# Patient Record
Sex: MALE
[De-identification: names, ages, dates, MRNs, and addresses within clinical notes are randomized per-mention and may not be internally consistent; named-entity substitution may affect disease eponyms.]

---

## 2017-02-15 ENCOUNTER — HOSPITAL ENCOUNTER (OUTPATIENT)
Dept: PHYSICAL THERAPY | Facility: CLINIC | Age: 57
Setting detail: THERAPIES SERIES
Discharge: HOME | End: 2017-02-15
Payer: COMMERCIAL

## 2017-02-15 PROCEDURE — 97140 MANUAL THERAPY 1/> REGIONS: CPT

## 2017-03-01 ENCOUNTER — HOSPITAL ENCOUNTER (OUTPATIENT)
Dept: PHYSICAL THERAPY | Facility: CLINIC | Age: 57
Setting detail: THERAPIES SERIES
Discharge: HOME | End: 2017-03-01
Payer: COMMERCIAL

## 2017-03-01 PROCEDURE — 97140 MANUAL THERAPY 1/> REGIONS: CPT

## 2017-03-15 ENCOUNTER — HOSPITAL ENCOUNTER (OUTPATIENT)
Dept: PHYSICAL THERAPY | Facility: CLINIC | Age: 57
Setting detail: THERAPIES SERIES
Discharge: HOME | End: 2017-03-15
Payer: COMMERCIAL

## 2017-03-15 PROCEDURE — 97140 MANUAL THERAPY 1/> REGIONS: CPT

## 2017-04-05 ENCOUNTER — HOSPITAL ENCOUNTER (OUTPATIENT)
Dept: PHYSICAL THERAPY | Facility: CLINIC | Age: 57
Setting detail: THERAPIES SERIES
Discharge: HOME | End: 2017-04-05
Payer: COMMERCIAL

## 2017-04-05 PROCEDURE — 97140 MANUAL THERAPY 1/> REGIONS: CPT

## 2017-04-05 PROCEDURE — 97110 THERAPEUTIC EXERCISES: CPT

## 2017-04-19 ENCOUNTER — HOSPITAL ENCOUNTER (OUTPATIENT)
Dept: PHYSICAL THERAPY | Facility: CLINIC | Age: 57
Setting detail: THERAPIES SERIES
Discharge: HOME | End: 2017-04-19
Payer: COMMERCIAL

## 2017-04-19 PROCEDURE — 97140 MANUAL THERAPY 1/> REGIONS: CPT

## 2017-04-19 PROCEDURE — 97112 NEUROMUSCULAR REEDUCATION: CPT

## 2017-04-19 PROCEDURE — 97110 THERAPEUTIC EXERCISES: CPT

## 2017-05-04 ENCOUNTER — HOSPITAL ENCOUNTER (OUTPATIENT)
Dept: PHYSICAL THERAPY | Facility: CLINIC | Age: 57
Setting detail: THERAPIES SERIES
Discharge: HOME | End: 2017-05-04
Payer: COMMERCIAL

## 2017-05-04 PROCEDURE — 97140 MANUAL THERAPY 1/> REGIONS: CPT

## 2017-05-04 PROCEDURE — 97112 NEUROMUSCULAR REEDUCATION: CPT

## 2017-06-12 ENCOUNTER — HOSPITAL ENCOUNTER (OUTPATIENT)
Dept: PHYSICAL THERAPY | Facility: CLINIC | Age: 57
Setting detail: THERAPIES SERIES
Discharge: HOME | End: 2017-06-12
Payer: COMMERCIAL

## 2017-06-12 PROCEDURE — 97140 MANUAL THERAPY 1/> REGIONS: CPT

## 2017-06-14 ENCOUNTER — HOSPITAL ENCOUNTER (OUTPATIENT)
Dept: PHYSICAL THERAPY | Facility: CLINIC | Age: 57
Setting detail: THERAPIES SERIES
Discharge: HOME | End: 2017-06-14
Payer: COMMERCIAL

## 2017-06-28 ENCOUNTER — HOSPITAL ENCOUNTER (OUTPATIENT)
Dept: PHYSICAL THERAPY | Facility: CLINIC | Age: 57
Setting detail: THERAPIES SERIES
Discharge: HOME | End: 2017-06-28
Payer: COMMERCIAL

## 2017-06-28 PROCEDURE — 97140 MANUAL THERAPY 1/> REGIONS: CPT

## 2017-07-26 ENCOUNTER — HOSPITAL ENCOUNTER (OUTPATIENT)
Dept: PHYSICAL THERAPY | Facility: CLINIC | Age: 57
Setting detail: THERAPIES SERIES
Discharge: HOME | End: 2017-07-26
Payer: COMMERCIAL

## 2017-07-26 PROCEDURE — 97140 MANUAL THERAPY 1/> REGIONS: CPT

## 2017-08-02 ENCOUNTER — APPOINTMENT (OUTPATIENT)
Dept: PHYSICAL THERAPY | Facility: CLINIC | Age: 57
End: 2017-08-02
Payer: COMMERCIAL

## 2017-08-16 ENCOUNTER — HOSPITAL ENCOUNTER (OUTPATIENT)
Dept: PHYSICAL THERAPY | Facility: CLINIC | Age: 57
Setting detail: THERAPIES SERIES
Discharge: HOME | End: 2017-08-16
Payer: COMMERCIAL

## 2017-08-16 PROCEDURE — 97140 MANUAL THERAPY 1/> REGIONS: CPT

## 2017-08-16 PROCEDURE — 97110 THERAPEUTIC EXERCISES: CPT

## 2017-08-16 PROCEDURE — 97161 PT EVAL LOW COMPLEX 20 MIN: CPT

## 2017-08-21 ENCOUNTER — HOSPITAL ENCOUNTER (OUTPATIENT)
Dept: PHYSICAL THERAPY | Facility: CLINIC | Age: 57
Setting detail: THERAPIES SERIES
Discharge: HOME | End: 2017-08-21
Payer: COMMERCIAL

## 2017-08-21 PROCEDURE — 97110 THERAPEUTIC EXERCISES: CPT

## 2017-08-21 PROCEDURE — 97140 MANUAL THERAPY 1/> REGIONS: CPT

## 2017-08-23 ENCOUNTER — HOSPITAL ENCOUNTER (OUTPATIENT)
Dept: PHYSICAL THERAPY | Facility: CLINIC | Age: 57
Setting detail: THERAPIES SERIES
End: 2017-08-23
Payer: COMMERCIAL

## 2017-08-23 ENCOUNTER — HOSPITAL ENCOUNTER (OUTPATIENT)
Dept: PHYSICAL THERAPY | Facility: CLINIC | Age: 57
Setting detail: THERAPIES SERIES
Discharge: HOME | End: 2017-08-23
Payer: COMMERCIAL

## 2017-08-23 PROCEDURE — 97140 MANUAL THERAPY 1/> REGIONS: CPT

## 2017-08-23 PROCEDURE — 97110 THERAPEUTIC EXERCISES: CPT

## 2017-08-28 ENCOUNTER — HOSPITAL ENCOUNTER (OUTPATIENT)
Dept: PHYSICAL THERAPY | Facility: CLINIC | Age: 57
Setting detail: THERAPIES SERIES
Discharge: HOME | End: 2017-08-28
Payer: COMMERCIAL

## 2017-08-28 PROCEDURE — 97110 THERAPEUTIC EXERCISES: CPT

## 2017-08-31 ENCOUNTER — HOSPITAL ENCOUNTER (OUTPATIENT)
Dept: PHYSICAL THERAPY | Facility: CLINIC | Age: 57
Setting detail: THERAPIES SERIES
Discharge: HOME | End: 2017-08-31
Payer: COMMERCIAL

## 2017-08-31 PROCEDURE — 97110 THERAPEUTIC EXERCISES: CPT

## 2017-09-07 ENCOUNTER — HOSPITAL ENCOUNTER (OUTPATIENT)
Dept: PHYSICAL THERAPY | Facility: CLINIC | Age: 57
Setting detail: THERAPIES SERIES
Discharge: HOME | End: 2017-09-07
Payer: COMMERCIAL

## 2017-09-07 PROCEDURE — 97110 THERAPEUTIC EXERCISES: CPT

## 2017-09-07 PROCEDURE — G0283 ELEC STIM OTHER THAN WOUND: HCPCS

## 2017-09-11 ENCOUNTER — HOSPITAL ENCOUNTER (OUTPATIENT)
Dept: PHYSICAL THERAPY | Facility: CLINIC | Age: 57
Setting detail: THERAPIES SERIES
Discharge: HOME | End: 2017-09-11
Payer: COMMERCIAL

## 2017-09-11 PROCEDURE — 97140 MANUAL THERAPY 1/> REGIONS: CPT

## 2017-09-14 ENCOUNTER — HOSPITAL ENCOUNTER (OUTPATIENT)
Dept: PHYSICAL THERAPY | Facility: CLINIC | Age: 57
Setting detail: THERAPIES SERIES
End: 2017-09-14
Payer: COMMERCIAL

## 2017-09-18 ENCOUNTER — HOSPITAL ENCOUNTER (OUTPATIENT)
Dept: PHYSICAL THERAPY | Facility: CLINIC | Age: 57
Setting detail: THERAPIES SERIES
Discharge: HOME | End: 2017-09-18
Payer: COMMERCIAL

## 2017-09-18 PROCEDURE — 97110 THERAPEUTIC EXERCISES: CPT

## 2017-09-18 PROCEDURE — G0283 ELEC STIM OTHER THAN WOUND: HCPCS

## 2017-09-20 ENCOUNTER — APPOINTMENT (OUTPATIENT)
Dept: PHYSICAL THERAPY | Facility: CLINIC | Age: 57
End: 2017-09-20
Payer: COMMERCIAL

## 2017-09-25 ENCOUNTER — HOSPITAL ENCOUNTER (OUTPATIENT)
Dept: PHYSICAL THERAPY | Facility: CLINIC | Age: 57
Setting detail: THERAPIES SERIES
Discharge: HOME | End: 2017-09-25
Payer: COMMERCIAL

## 2017-09-25 PROCEDURE — 97110 THERAPEUTIC EXERCISES: CPT

## 2017-09-28 ENCOUNTER — HOSPITAL ENCOUNTER (OUTPATIENT)
Dept: PHYSICAL THERAPY | Facility: CLINIC | Age: 57
Setting detail: THERAPIES SERIES
Discharge: HOME | End: 2017-09-28
Payer: COMMERCIAL

## 2017-09-28 PROCEDURE — 97110 THERAPEUTIC EXERCISES: CPT

## 2017-10-02 ENCOUNTER — APPOINTMENT (OUTPATIENT)
Dept: PHYSICAL THERAPY | Facility: CLINIC | Age: 57
End: 2017-10-02
Payer: COMMERCIAL

## 2017-10-03 ENCOUNTER — HOSPITAL ENCOUNTER (OUTPATIENT)
Dept: PHYSICAL THERAPY | Facility: CLINIC | Age: 57
Setting detail: THERAPIES SERIES
Discharge: HOME | End: 2017-10-03
Payer: COMMERCIAL

## 2017-10-03 PROCEDURE — 97110 THERAPEUTIC EXERCISES: CPT

## 2017-10-03 NOTE — FLOWSHEET NOTE
[x] [facility] [employer]  [address] [place] [address] [place]   Phone: [phone]  Fax: [phone]  Physical Therapy Daily Treatment Note  Date: 10/3/2017  Patient: Marilia Constantino : 1960 MRN: [id number]  Physician: Dr Yaya Escoto: Magda Parker Diagnosis: R femoral stress fx, stiffness R hip, pain in R thigh Rehab Codes: M25.651, M75.651   Onset date: 16 Next 's appt.:prn      Visit# / total visits:  Cancels/No Shows: 0      Subjective:   Pain: [] Yes [] No Location: B medial quad/adductor Pain Rating: (0-10 scale): 0/10   Pain altered Tx: [] No [x] Yes Action: ART/MFR  Comments: Continued progress with balance and L > R ant tib weakness, Reports walking 6 miles on  and was dragging his L LE by the time he was done.  Pancho Campoverde      Objective: =  pelvis this date, ambulating slightly faster, and less LOB.  2/5 L ant tib strength; R = 3-/5   Modalities:  hi volt stim to L ant tib.  10 min post ex   Exercises:  Exercise Reps/ Time Weight/ Level Comments Performed   Rev of HEP,  X     added 4 way hip with TB to HEP X   sci fit 10 min  1.5       Total Gym jumps (B LE)  14 Added 10/3           Total Gym -squats SL 2 min ea with breaks   L20    X   Lunge (alt) FW 2/10'  Added 10/3 X   Single FW lunge in place   Added 10/3, Increased LOB L X   FW lunge/side lunge   Added 10/3 LOB L X   4 pt hip ext/fire hydrant  B  X          Doorway st  HEP   30         pulleys 5 min            B TB pull down    blue   HEP inst      B TB row   blue   HEP inst       TM  5 min  2.5 mph         Bars - standing abd               Bars - march FW/BW 6x       Bwd marching increased balance loss (requires BUE to self-recover)      Bars - SLS - balance  30 s x 3 ea             Bars - rocker board balance  2 min l            Side-stepping/monster walks 15 ft x 4 ea  blk tube  Around feet X   Step ups 2/10 8 in        Heel raises/toe raises 10         X    [place]/cariocas  8/15'              BOSU - balance standing/weight shift 5 min    Added FW/BW, cw/ccw 10/3   X   Bars reach toward floor with SLS L > R   3 min    Added 9/28    Cone ex with 8 inch step, SLS   1 set ea      Added 10/3   X   Gastroc Stretch- wedge  1 min x 2    Added to HEP; added 9/5         slant board stretch   3 30      X   wall push ups  home      HEP inst       4 way hip ea  2/10 blue Added to HEP      4 way ankle with TB   rev   red   HEP inst      Other:           Specific Instructions for next treatment:Progress EX log with emphasis on balance and strengthening   Treatment Charges: Mins Units   [x] Modalities/ES Hi volt  10  NC   [x] Ther Exercise 60 4   [] Manual Therapy         [] Ther Activities         [] Aquatics         [] Vasocompression         [] NMR         Total Treatment time 61   4           Assessment: [x] Progressing toward goals. [] No change. [x] Other:Pt tolerated tx well today. Continued progression of balance noted. Patient with continued L LE weakness and will continue to benefit from PT intervention to improve overall strength, increase balance, and return pt to PLOF.  L ant tib weakness addressed with HiVolt stim today. Assess tolerance and effectiveness next treatment.       STG: (to be met in 6 treatments)  1. ? Pain: to <2/10 with running up to 10 miles  2. ? ROM: neg IP, TFL, HS, and RF stiffness. 3. ? Function: run within training program without increased pain  4. Independent with Home Exercise Programs  5. Demonstrate Knowledge of fall prevention  LTG: (to be met in 12 treatments)  1. Return to running without pain up to 16 miles or more per run.      Patient goals: To return to running injury free      Pt. Education: [x] Yes [] No [x] Reviewed Prior HEP/Ed: added standing calf stretch  Method of Education: [x] Verbal [x] Demo [] Written  Comprehension of Education:  [x] Verbalizes understanding. [x] Demonstrates understanding. [] Needs review.   [x] Demonstrates/verbalizes HEP/Ed previously given.       Plan: [x] Continue per plan of care.   [] Other:      Time In: 56 Time Out: 1005  Electronically signed by: Mustapha Lomeli PT

## 2017-10-05 ENCOUNTER — APPOINTMENT (OUTPATIENT)
Dept: PHYSICAL THERAPY | Facility: CLINIC | Age: 57
End: 2017-10-05
Payer: COMMERCIAL

## 2017-10-09 ENCOUNTER — HOSPITAL ENCOUNTER (OUTPATIENT)
Dept: PHYSICAL THERAPY | Facility: CLINIC | Age: 57
Setting detail: THERAPIES SERIES
Discharge: HOME | End: 2017-10-09
Payer: COMMERCIAL

## 2017-10-09 PROCEDURE — 97110 THERAPEUTIC EXERCISES: CPT

## 2017-10-09 NOTE — FLOWSHEET NOTE
[x] [facility] [employer]  [address] [place] [address]  Phone: [phone]  Fax: [phone]  Physical Therapy Daily Treatment Note  Date: 10/3/2017  Patient: Lawanda Patel : 1960 MRN: [id number]  Physician: Dr Jordy Rivera: Mary Yousif Diagnosis: R femoral stress fx, stiffness R hip, pain in R thigh Rehab Codes: M25.651, M75.651   Onset date: 16 Next 's appt.:prn      Visit# / total visits:  Cancels/No Shows: 0      Subjective:   Pain: [] Yes [] No Location: B medial quad/adductor Pain Rating: (0-10 scale): 0/10   Pain altered Tx: [] No [x] Yes Action: ART/MFR  Comments: Continued progress with balance and L > R ant tib weakness, Reports walking 4-6 miles per day      Objective: =  pelvis this date, ambulating slightly faster, and less LOB.  2/5 L ant tib strength; R = 3-/5   Modalities:  hi volt stim to L ant tib.  10 min post ex   Exercises:  Exercise Reps/ Time Weight/ Level Comments Performed   Rev of HEP,  X     added 4 way hip with TB to HEP X   sci fit 10 min  1.5    --   Total Gym jumps (B LE)  14 Added 10/3 --   TG unilateral DFs 3x15 L5/9 L/R (15/25%WB)  x   Total Gym - lat squats SL 2x15 L16 Modified  X   Lunge (alt) FW 2/10'  Added 10/3 X   Single FW lunge in place   Added 10/3, Increased LOB L X   FW lunge/side lunge   Added 10/3 LOB L X   4 pt hip ext/fire hydrant  B  X          Doorway st  HEP   30         pulleys 5 min            B TB pull down    blue   HEP inst      B TB row   blue   HEP inst       TM  10'  3.5 mph   hill program=5 x   Bars - standing abd               Bars - march FW/BW 6x       Bwd marching increased balance loss (requires BUE to self-recover)      Bars - SLS - balance  30 s x 3 ea             Bars - rocker board balance  2 min l            Side-stepping/monster walks 15 ft x 4 ea  blk tube  Around feet X   Clocks 5x  Functional LE reach x   Step ups 2/10 8 in        Heel raises/toe raises 10         -- review. [x] Demonstrates/verbalizes HEP/Ed previously given.       Plan: [x] Continue per plan of care.   [] Other:      Time In: 1110  Time Out:1220  Electronically signed by: Zulma Naranjo PT

## 2017-10-12 ENCOUNTER — HOSPITAL ENCOUNTER (OUTPATIENT)
Dept: PHYSICAL THERAPY | Facility: CLINIC | Age: 57
Setting detail: THERAPIES SERIES
Discharge: HOME | End: 2017-10-12
Payer: COMMERCIAL

## 2017-10-12 PROCEDURE — G0283 ELEC STIM OTHER THAN WOUND: HCPCS

## 2017-10-12 PROCEDURE — 97110 THERAPEUTIC EXERCISES: CPT

## 2017-10-12 NOTE — FLOWSHEET NOTE
[x] [facility] [employer]  [address] [place] [address]  Phone: [phone]  Fax: [phone]  Physical Therapy Daily Treatment Note  Date: 10/3/2017  Patient: Eddie Ackerman   : 1960  MRN: [id number]  Physician: Dr Nona Torres: Zeus Stanley Diagnosis: R femoral stress fx, stiffness R hip, pain in R thigh   Rehab Codes: M25.651, M75.651   Onset date: 16    Next 's appt.:prn   Visit# / total visits:    Cancels/No Shows: 0      Subjective:   Pain: [] Yes [x] No Location: B medial quad/adductor Pain Rating: (0-10 scale): 0/10   Pain altered Tx: [x] No [x] Yes Action:   Comments: reports that he had a significant amount of fatigue after his last visit, but nothing adverse per pt. Also reports the VMS to L ant tib was better as he feels that he had better motor control with VMS vs hi volt.      Objective:    Modalities:  VMS  to L ant tib.  10 min post ex   Exercises:  Exercise Reps/ Time Weight/ Level Comments Performed   Lateral elliptical 8'  Alt every 2 min x   Rev of HEP,  X     added 4 way hip with TB to HEP X   Total Gym jumps (B LE)  14 Added 10/3 --   TG unilateral DFs 3x15 L5/10 L/R (15/25%WB)  x   Total Gym - lat squats SL 2x15 L16 Modified  x   Lunge (alt) FW 2/10'  Added 10/3    Single FW lunge in place   Added 10/3, Increased LOB L    FW lunge/side lunge   Added 10/3 LOB L    4 pt hip ext/fire hydrant  B     Doorway st  HEP   30         pulleys 5 min            B TB pull down    blue   HEP inst      B TB row   blue   HEP inst       TM  10'  3.5 mph   hill program=5 HEP only   Bars - march FW/BW 6x       Bwd marching increased balance loss (requires BUE to self-recover)      Bars - SLS - balance  30 s x 3 ea             Bars - rocker board balance  2 min l            Side-stepping/monster walks 15 ft x 4 ea  blk tube  Around feet x   Clocks 5x  Functional LE reach x   Step ups 3 way 15 ea 4\"    x   Heel raises/toe raises 10         --    [place]  8/15'              BOSU - balance standing/weight shift 20    side to side x   BAPS 2x15 L2 susy x   Bars reach toward floor with SLS L > R   3 min    Added 9/28    Cone ex with 8 inch step, SLS   1 set ea      Added 10/3      Gastroc Stretch- wedge  1 min x 2    Added to HEP; added 9/5         slant board stretch   3 30\"    x   wall push ups  home      HEP inst       4 way hip ea  2/10 blue Added to HEP      4 way ankle with TB   rev   red   HEP inst   --   Other:   VMS to L ant tib 10'  10sec on/50 sec off          Specific Instructions for next treatment:Progress EX log with emphasis on balance and strengthening   Treatment Charges: Mins Units   [x] Modalities/ES VMS  10  NC   [x] Ther Exercise 60 4   [] Manual Therapy         [] Ther Activities         [] Aquatics         [] Vasocompression         [] NMR         Total Treatment time 79   4           Assessment: [x] Progressing toward goals. [] No change. [x] Other:pt demonstrates tendency towards quad dominance. Clocks were challenging enough to require a rest break every 1-2 reps. At 15% WB, client was able to perform 75% ROM with unilateral DF.  Brettmichelle Jennifer states the program was suffiently challenging.      STG: (to be met in 6 treatments)  1. ? Pain: to <2/10 with running up to 10 miles  2. ? ROM: neg IP, TFL, HS, and RF stiffness. 3. ? Function: run within training program without increased pain  4. Independent with Home Exercise Programs  5. Demonstrate Knowledge of fall prevention  LTG: (to be met in 12 treatments)  1. Return to running without pain up to 16 miles or more per run.      Patient goals: To return to running injury free      Pt. Education: [x] Yes [] No [] Reviewed Prior HEP/Ed:   Method of Education: [x] Verbal [x] Demo [] Written  Comprehension of Education:  [x] Verbalizes understanding. [x] Demonstrates understanding. [] Needs review.   [x] Demonstrates/verbalizes HEP/Ed previously given.       Plan: [x] Continue per plan of care.   [] Other:      Time In: 1110  Time Out:1220  Electronically signed by: Liyah Alcazar PT

## 2017-10-16 ENCOUNTER — HOSPITAL ENCOUNTER (OUTPATIENT)
Dept: PHYSICAL THERAPY | Facility: CLINIC | Age: 57
Setting detail: THERAPIES SERIES
Discharge: HOME | End: 2017-10-16
Payer: COMMERCIAL

## 2017-10-16 PROCEDURE — 97110 THERAPEUTIC EXERCISES: CPT

## 2017-10-16 NOTE — FLOWSHEET NOTE
self-recover)      Bars - SLS - balance  30 s x 3 ea             Bars - rocker board balance  2 min l            Side-stepping/monster walks 15 ft x 5 ea  blk tube  Around feet x   Clocks 5x   Functional LE reach x   Step ups 3 way 30 ea 4\"    x   Heel raises/toe raises 10         --    [place]/cari[place]as  8/15'              BOSU - balance standing/weight shift 20    side to side x   BAPS 2x15 L2 susy x   Bars reach toward floor with SLS L > R   3 min    Added 9/28     Cone ex with 8 inch step, SLS   1 set ea      Added 10/3      Gastroc Stretch- wedge  1 min x 2    Added to HEP; added 9/5         slant board stretch   3 30\"    x   wall push ups  home      HEP inst       4 way hip ea  2/10 blue Added to HEP       4 way ankle with TB   rev   red   HEP inst   --   Other:   VMS to L ant tib 10'  10sec on/50 sec off          Specific Instructions for next treatment:Progress EX log with emphasis on balance and strengthening   Treatment Charges: Mins Units   [x] Modalities/ES VMS  10  NC   [x] Ther Exercise 60 4   [] Manual Therapy         [] Ther Activities         [] Aquatics         [] Vasocompression         [] NMR         Total Treatment time 79   4           Assessment: [x] Progressing toward goals. [] No change. [x] Other:pt demonstrates tendency towards quad dominance. Clocks were challenging enough to require a rest break every 1-2 reps. At 15% WB, client was able to perform 75% ROM with unilateral DF.       STG: (to be met in 6 treatments)  1. ? Pain: to <2/10 with running up to 10 miles  2. ? ROM: neg IP, TFL, HS, and RF stiffness. 3. ? Function: run within training program without increased pain  4. Independent with Home Exercise Programs  5. Demonstrate Knowledge of fall prevention  LTG: (to be met in 12 treatments)  1. Return to running without pain up to 16 miles or more per run.      Patient goals: To return to running injury free      Pt.  Education: [x] Yes [] No [] Reviewed Prior HEP/Ed:   Method of Education: [x] Verbal [x] Demo [] Written  Comprehension of Education:  [x] Verbalizes understanding. [x] Demonstrates understanding. [] Needs review. [x] Demonstrates/verbalizes HEP/Ed previously given.       Plan: [x] Continue per plan of care.   [] Other:      Time In: 1110                                  Time Out:1220  Electronically signed by: Citlalli Winslow, PT

## 2017-10-18 ENCOUNTER — HOSPITAL ENCOUNTER (OUTPATIENT)
Dept: PHYSICAL THERAPY | Facility: CLINIC | Age: 57
Setting detail: THERAPIES SERIES
Discharge: HOME | End: 2017-10-18
Payer: COMMERCIAL

## 2017-10-18 PROCEDURE — 97140 MANUAL THERAPY 1/> REGIONS: CPT

## 2017-10-18 PROCEDURE — 97110 THERAPEUTIC EXERCISES: CPT

## 2017-10-18 NOTE — FLOWSHEET NOTE
[x] [facility] [employer]  [address] [place] [address]  Phone: [phone]  Fax: [phone]  Physical Therapy Daily Treatment Note  Date: 10/18/2017  Patient: Darrell Roberts                                    BDA: 1960                    MRN: [id number]  Physician: Dr Amador Anger: Bibiana Goss  Medical Diagnosis: R femoral stress fx, stiffness R hip, pain in R thigh   Rehab Codes: M25.651, M75.651   Onset date: 8-20-16                                                           Next Dr's appt.:prn   Visit# / total visits: 20/24                                         Cancels/No Shows: 0      Subjective:   Pain: [] Yes [x] No Location: B medial quad/adductor Pain Rating: (0-10 scale): 0/10 No pain. Just numbness in B hands and feet with the cold weather. Having MRI tonight on his neck. Pain altered Tx: [x] No [x] Yes Action:   Comments: reports that he felt good after the last visit. Mary Soles reports the VMS to L ant tib was better as he feels that he had better motor control with VMS. Pt reports he walked 6 miles over the weekend and didn't get a\" foot drop\" until the end.       Objective:  Pt still presents with severe forward head position. Modalities:  VMS  to L ant tib. 10 min post ex, CST/MFR to SOB, with visual I & D to cranial vault in supine with 2 pillows for 20 min.    Exercises:  Exercise Reps/ Time Weight/ Level Comments Performed   Lateral elliptical 8'  L3 Alt every 2 min x   Rev of HEP,  X     added 4 way hip with TB to HEP X   Total Gym jumps (B LE) 2/8 14 Added 10/3 --   TG unilateral DFs 3x15 L16 L/R  x   Total Gym - lat squats SL 3x15 L16 Modified  x   Lunge (alt) FW 2/10'   Added 10/3     Single FW lunge in place 1/8   Added 10/3, Increased LOB L     FW lunge/side lunge 1/5   Added 10/3 LOB L     4 pt hip ext 10 B  emphasis on core and scap ret  x   Doorway st  HEP   30         pulleys 5 min            B TB pull down 2/12   blue   HEP inst      B TB row 2/12  blue   HEP inst       TM  10'  3.5 mph   hill program=5 HEP only   Bars - march FW/BW 6x       Bwd marching increased balance loss (requires BUE to self-recover)      Bars - SLS - balance  30 s x 3 ea             Bars - rocker board balance  2 min l            Side-stepping/monster walks 15 ft x 5 ea  blk tube  Around feet x   Clocks 5x   Functional LE reach x   Step ups 3 way 3/5 ea 4\"    x   Heel raises/toe raises 10         --    [place]/cari[place]  8/15'              BOSU - balance standing/weight shift 20    side to side x   BAPS 2x15 L2 susy x   Bars reach toward floor with SLS L > R   3 min    Added 9/28     Cone ex with 8 inch step, SLS   1 set ea      Added 10/3      Gastroc Stretch- wedge  1 min x 2    Added to HEP; added 9/5         slant board stretch   3 30\"    x   wall push ups  home      HEP inst       4 way hip ea  2/10 blue Added to HEP       4 way ankle with TB   rev   red   HEP inst   --   Other:   VMS to L ant tib 10'  10sec on/50 sec off          Specific Instructions for next treatment:Progress EX log with emphasis on balance and strengthening, postural inst  Treatment Charges: Mins Units   [x] Modalities/ES VMS  10  NC   [x] Ther Exercise 60 4   [x] Manual Therapy   20   1   [] Ther Activities         [] Aquatics         [] Vasocompression         [] NMR         Total Treatment time 90   5           Assessment: [x] Progressing toward goals. Improved head and neck position post manual therapy  [] No change. [x] Other:pt demonstrates tendency towards quad dominance. Fewer breaks needed with ex. Needs cues for technique especial with clocks, monster walk position and step ups.        STG: (to be met in 6 treatments)  1. ? Pain: to <2/10 with running up to 10 miles  2. ? ROM: neg IP, TFL, HS, and RF stiffness. 3. ? Function: run within training program without increased pain  4. Independent with Home Exercise Programs  5.  Demonstrate Knowledge of fall prevention  LTG: (to be met in 12 treatments)  1. Return to running without pain up to 16 miles or more per run.      Patient goals: To return to running injury free      Pt. Education: [x] Yes [] No [x] Reviewed Prior HEP/Ed: scap and cervical retraction exercises  Method of Education: [x] Verbal [x] Demo [] Written  Comprehension of Education:  [x] Verbalizes understanding. [x] Demonstrates understanding. [] Needs review. [x] Demonstrates/verbalizes HEP/Ed previously given.       Plan: [x] Continue per plan of care.    [] Other:      Time In: 0830                               Time Out:1000  Electronically signed by: Anni Menjivar, PT

## 2017-10-23 ENCOUNTER — HOSPITAL ENCOUNTER (OUTPATIENT)
Dept: PHYSICAL THERAPY | Facility: CLINIC | Age: 57
Setting detail: THERAPIES SERIES
Discharge: HOME | End: 2017-10-23
Payer: COMMERCIAL

## 2017-10-23 PROCEDURE — 97110 THERAPEUTIC EXERCISES: CPT

## 2017-10-23 NOTE — FLOWSHEET NOTE
hill program=5 HEP only   Bars - march FW/BW 6x       Bwd marching increased balance loss (requires BUE to self-recover)      Bars - SLS - balance  30 s x 3 ea             Bars - rocker board balance  2 min l            Side-stepping/monster walks 15 ft x 5 ea  blk tube  Around feet x   Clocks 5x   Functional LE reach x   Step ups 3 way 3/5 ea 4\"    x   Heel raises/toe raises 10         --    [place]/cariocas  8/15'              BOSU - balance standing/weight shift 20  cw/ccw, squats added squats 10/23 x   BAPS 2x15 L2 susy x   Bars reach toward floor with SLS L > R   3 min    Added 9/28     Cone ex with 8 inch step, SLS   1 set ea        x   Gastroc Stretch- wedge  1 min x 2    Added to HEP; added 9/5         slant board stretch   3 30\"    x   wall push ups  home      HEP inst       4 way hip ea  2/10 blue Added to HEP       4 way ankle with TB   rev   red   HEP inst   --   Flex/abd PREs B UEs 12 2#  x                                      Other:   VMS to L ant tib 10'  10sec on/50 sec off          Specific Instructions for next treatment:Progress EX log with emphasis on balance and strengthening, postural inst  Treatment Charges: Mins Units   [x] Modalities/ES VMS  10  NC   [x] Ther Exercise 60 4   [x] Manual Therapy        [] Ther Activities         [] Aquatics         [] Vasocompression         [] NMR         Total Treatment time 61   4           Assessment: [x] Progressing toward goals. Improved balance, but continues to have LOB with single leg functional activities  [] No change. [x] Other:pt demonstrates tendency towards quad dominance. Fewer breaks needed with ex. Needs cues for technique especial with clocks, monster walk position and step ups.        STG: (to be met in 6 treatments)  1. ? Pain: to <2/10 with running up to 10 miles  2. ? ROM: neg IP, TFL, HS, and RF stiffness. 3. ? Function: run within training program without increased pain  4. Independent with Home Exercise Programs  5.  Demonstrate Knowledge of fall prevention  LTG: (to be met in 12 treatments)  1. Return to running without pain up to 16 miles or more per run.      Patient goals: To return to running injury free      Pt. Education: [x] Yes [] No [x] Reviewed Prior HEP/Ed: scap and cervical retraction exercises  Method of Education: [x] Verbal [x] Demo [] Written  Comprehension of Education:  [x] Verbalizes understanding. [x] Demonstrates understanding. [] Needs review. [x] Demonstrates/verbalizes HEP/Ed previously given.       Plan: [x] Continue per plan of care.    [] Other:      Time In: 0789                              [facility] [id number]  Electronically signed by: Darien Almaraz, PT

## 2017-10-25 ENCOUNTER — APPOINTMENT (OUTPATIENT)
Dept: PHYSICAL THERAPY | Facility: CLINIC | Age: 57
End: 2017-10-25
Payer: COMMERCIAL

## 2017-10-30 ENCOUNTER — HOSPITAL ENCOUNTER (OUTPATIENT)
Dept: PHYSICAL THERAPY | Facility: CLINIC | Age: 57
Setting detail: THERAPIES SERIES
Discharge: HOME | End: 2017-10-30
Payer: COMMERCIAL

## 2017-10-30 PROCEDURE — 97140 MANUAL THERAPY 1/> REGIONS: CPT

## 2017-10-30 PROCEDURE — 97110 THERAPEUTIC EXERCISES: CPT

## 2017-10-30 NOTE — FLOWSHEET NOTE
Side-stepping/monster walks 15 ft x 5 ea  blk tube  Around feet x   Clocks 5x   Functional LE reach x   Step ups 3 way 3/5 ea 4\"    x   Heel raises/toe raises 10         --    [place]/cariocas  8/15'              BOSU - balance standing/weight shift 20  cw/ccw, squats added squats 10/23 x   BAPS 2x15 L2 susy x   Bars reach toward floor with SLS L > R   3 min    Added 9/28     Cone ex with 8 inch step, SLS   1 set ea        x   Gastroc Stretch- wedge  1 min x 2    Added to HEP; added 9/5         slant board stretch   3 30\"    x   wall push ups  home      HEP inst       4 way hip ea  2/10 blue Added to HEP       4 way ankle with TB   rev   red   HEP inst   --   Flex/abd PREs B UEs 12 2#   x   eccentric calf raises  10    on step, 2 up 1 down  x                                                   Other:   VMS to L ant tib 10'  10sec on/50 sec off          Specific Instructions for next treatment:Progress EX log with emphasis on balance and strengthening, postural inst. CST/MFR, SOR  Treatment Charges: Mins Units   [x] Modalities/ES VMS  10  NC   [x] Ther Exercise 60 4   [x] Manual Therapy  15   1   [] Ther Activities         [] Aquatics         [] Vasocompression         [] NMR         Total Treatment time 76   5           Assessment: [x] Progressing toward goals. Improved balance, but continues to have LOB with single leg functional activities  [] No change. [x] Other:pt demonstrates tendency towards quad dominance. Fewer breaks needed with ex. Needs cues for technique especial with clocks, monster walk position and step ups.        STG: (to be met in 6 treatments)  1. ? Pain: to <2/10 with running up to 10 miles  2. ? ROM: neg IP, TFL, HS, and RF stiffness. 3. ? Function: run within training program without increased pain  4. Independent with Home Exercise Programs  5. Demonstrate Knowledge of fall prevention  LTG: (to be met in 12 treatments)  1.  Return to running without pain up to 16 miles or more per run.      Patient goals: To return to running injury free      Pt. Education: [x] Yes [] No [x] Reviewed Prior HEP/Ed: scap and cervical retraction exercises  Method of Education: [x] Verbal [x] Demo [] Written  Comprehension of Education:  [x] Verbalizes understanding. [x] Demonstrates understanding. [] Needs review. [x] Demonstrates/verbalizes HEP/Ed previously given.       Plan: [x] Continue per plan of care.    [] Other:      Time In: 0132                              [facility] [id number]  Electronically signed by: Sharon Cha PT

## 2017-11-06 ENCOUNTER — HOSPITAL ENCOUNTER (OUTPATIENT)
Dept: PHYSICAL THERAPY | Facility: CLINIC | Age: 57
Setting detail: THERAPIES SERIES
Discharge: HOME | End: 2017-11-06
Payer: COMMERCIAL

## 2017-11-06 PROCEDURE — 97110 THERAPEUTIC EXERCISES: CPT

## 2017-11-06 NOTE — FLOWSHEET NOTE
[x] [facility]  [address] [facility]  Phone: [phone]  Fax: [phone]  Physical Therapy Daily Treatment Note  Date: 11/6/2017  Patient: Toan Chamorro                                    WSM: 1960                    MRN: [id number]  Physician: Dr Sharon Zuluaga: [facility] Diagnosis: R femoral stress fx, stiffness R hip, pain in R thigh   Rehab Codes: M25.651, M75.651   Onset date: 8-20-16                                                           Next 's appt.:prn   Visit# / total visits: 25/32                                       Cancels/No Shows: 0      Subjective:   Pain: [] Yes [x] No Location: B medial quad/adductor Pain Rating: (0-10 scale): 0/10 Ran 8 miles this weekend. [place] HS pull on left side after about 6 miles with L foot starting to flop. Pain altered Tx: [x] No [x] Yes Action:   Comments  Run/walking 4 miles a day.        Objective:  Pt still presents with severe forward head position. Modalities:  VMS  to L ant tib.  10 min post ex, cupping to L HS with ROM in R sidelying  Exercises:  Exercise Reps/ Time Weight/ Level Comments Performed   Lateral elliptical 8'  L3 Alt every 2 min x   Rev of HEP,  X     added 4 way hip with TB to HEP X   Total Gym jumps (B LE) rev 14 Added 10/3 --   TG unilateral DFs 3x15 L18 L/R  x   Total Gym - lat squats SL 3x15 L18   x   Lunge (alt) FW 2/10'   Added 10/3     Single FW lunge in place 1/8   Added 10/3, Increased LOB L     FW lunge/side lunge 1/5   Added 10/3 LOB L                 4 pt hip ext with opp UE flex alt 8     x   4 pt hip ext 10 B  emphasis on core and scap ret  x   Doorway st  HEP   30         pulleys 5 min            B TB pull down 2/12   blue   HEP inst      B TB row 2/12  blue   HEP inst       TM  10'  3.5 mph   hill program=5 HEP only   Bars - march FW/BW 6x       Bwd marching increased balance loss (requires BUE to self-recover)      Bars - SLS -

## 2017-11-13 ENCOUNTER — HOSPITAL ENCOUNTER (OUTPATIENT)
Dept: PHYSICAL THERAPY | Facility: CLINIC | Age: 57
Setting detail: THERAPIES SERIES
Discharge: HOME | End: 2017-11-13
Payer: COMMERCIAL

## 2017-11-13 PROCEDURE — 97110 THERAPEUTIC EXERCISES: CPT

## 2017-11-13 PROCEDURE — G0283 ELEC STIM OTHER THAN WOUND: HCPCS

## 2017-11-13 PROCEDURE — 97140 MANUAL THERAPY 1/> REGIONS: CPT

## 2017-11-13 NOTE — FLOWSHEET NOTE
[x] [facility]  [address] [facility]  Phone: [phone]  Fax: [phone]  Physical Therapy Daily Treatment Note  Date: 11/13/2017  Patient: Eryn Renteria                                    KCT: 1960                    MRN: [id number]  Physician: Dr Karlos Conte: [facility]  Medical Diagnosis: R femoral stress fx, stiffness R hip, pain in R thigh   Rehab Codes: M25.651, M75.651   Onset date: 8-20-16                                                           Next 's appt.:prn   Visit# / total visits: 26/32                                       Cancels/No Shows: 0      Subjective:   Pain: [] Yes [x] No Location: B medial quad/adductor Pain Rating: (0-10 scale): 3/10 L HS prox attachment after run/walking 9 miles this weekend. L foot starting to flop at mile 6   Pain altered Tx: [x] No [x] Yes Action:   Comments  Run/walking 4 miles a day.        Objective:  B hip flexor tightness. Pt still presents with severe forward head position. Modalities:  VMS  to L ant tib.  10 min post ex, cupping to L HS with ROM in R sidelying, ART to L psoas  Exercises:  Exercise Reps/ Time Weight/ Level Comments Performed   Lateral elliptical 8'  L3 Alt every 2 min x   Rev of HEP,  X     added 4 way hip with TB to HEP X   Total Gym jumps (B LE) rev 14 Added 10/3 --   TG unilateral DFs 3x15 L18 L/R     Total Gym - lat squats SL 3x15 L20   x   Lunge (alt) FW 2/10'   Added 10/3     Single FW lunge in place 1/8   Added 10/3, Increased LOB L     FW lunge/side lunge 1/5   Added 10/3 LOB L                 4 pt hip ext with opp UE flex alt 8     x   4 pt hip ext 10 B  emphasis on core and scap ret  x   Doorway st  HEP   30         Standing single lunges with back LE suppoprt 8     Added 11/13, B   X   B TB pull down 2/12   blue   HEP inst      B TB row 2/12  blue   HEP inst       TM  10'  3.5 mph   hill program=5 HEP only   Bars - march FW/BW 6x       Bwd Programs  5. Demonstrate Knowledge of fall prevention  LTG: (to be met in 12 treatments)  1. Return to running without pain up to 16 miles or more per run.      Patient goals: To return to running injury free      Pt. Education: [x] Yes [] No [x] Reviewed Prior HEP/Ed: scap and cervical retraction exercises  Method of Education: [x] Verbal [x] Demo [] Written  Comprehension of Education:  [x] Verbalizes understanding. [x] Demonstrates understanding. [] Needs review. [x] Demonstrates/verbalizes HEP/Ed previously given.       Plan: [x] Continue per plan of care.    [] Other:      Time In: 7773                             ICVQ [id number]  Electronically signed by: Bland Oppenheim, PT

## 2017-11-20 ENCOUNTER — HOSPITAL ENCOUNTER (OUTPATIENT)
Dept: PHYSICAL THERAPY | Facility: CLINIC | Age: 57
Setting detail: THERAPIES SERIES
Discharge: HOME | End: 2017-11-20
Payer: COMMERCIAL

## 2017-11-20 PROCEDURE — 97110 THERAPEUTIC EXERCISES: CPT

## 2017-11-20 NOTE — FLOWSHEET NOTE
[x] [facility]  [address] [facility]  Phone: [phone]  Fax: [phone]  Physical Therapy Daily Treatment Note  Date: 11/20/2017  Patient: Tayo Spine                                    MEU: 1960                    MRN: [id number]  Physician: Dr Christine Castañeda: Quita Xiong  Medical Diagnosis: R femoral stress fx, stiffness R hip, pain in R thigh   Rehab Codes: M25.651, M75.651   Onset date: 8-20-16                                                           Next Dr's appt.:prn   Visit# / total visits: 27/32                                       Cancels/No Shows: 0      Subjective:   Pain: [] Yes [x] No Location: B medial quad/adductor Pain Rating: (0-10 scale): 3/10 walk/ran 6 miles without incident. L HS tightness continues. Having some issues with R shoulder. Pain altered Tx: [x] No [x] Yes Action:   Comments  Run/walking 4 miles a day.        Objective:  R post shoulder capsule and delt tightness. B hip flexor tightness. Pt still presents with severe forward head position. Modalities:  VMS  to L ant tib.  10 min post ex, cupping to L HS with ROM in R sidelying, ART to R post shouder capsule, delt, lat, teres major  Exercises:  Exercise Reps/ Time Weight/ Level Comments Performed   Lateral elliptical 8'  L3 Alt every 2 min x   Rev of HEP,  X     added 4 way hip with TB to HEP X   Total Gym jumps (B LE) rev 14 Added 10/3 --   TG unilateral DFs 3x15 L18 L/R      Total Gym - lat squats SL 3x15 L20      Lunge (alt) FW 2/10'   Added 10/3     Single FW lunge in place 1/8   Added 10/3, Increased LOB L     FW lunge/side lunge 1/5   Added 10/3 LOB L                 4 pt hip ext with opp UE flex alt 8     x   4 pt hip ext 10 B  emphasis on core and scap ret  x   Doorway st  HEP   30         Standing single lunges with back LE suppoprt 10     Added 11/13, B   X   B TB pull down 2/12   blue   HEP inst      B TB row 2/12  blue   HEP inst    stiffness. 3. ? Function: run within training program without increased pain  4. Independent with Home Exercise Programs  5. Demonstrate Knowledge of fall prevention  LTG: (to be met in 12 treatments)  1. Return to running without pain up to 16 miles or more per run.      Patient goals: To return to running injury free      Pt. Education: [x] Yes [] No [x] Reviewed Prior HEP/Ed: scap and cervical retraction exercises  Method of Education: [x] Verbal [x] Demo [] Written  Comprehension of Education:  [x] Verbalizes understanding. [x] Demonstrates understanding. [] Needs review. [x] Demonstrates/verbalizes HEP/Ed previously given.       Plan: [x] Continue per plan of care.    [] Other:      Time In: 5920                             [facility] [id number]  Electronically signed by: Ayaz Foy PT

## 2017-11-30 ENCOUNTER — HOSPITAL ENCOUNTER (OUTPATIENT)
Dept: PHYSICAL THERAPY | Facility: CLINIC | Age: 57
Setting detail: THERAPIES SERIES
Discharge: HOME | End: 2017-11-30
Payer: COMMERCIAL

## 2017-11-30 PROCEDURE — 97110 THERAPEUTIC EXERCISES: CPT

## 2017-11-30 PROCEDURE — 97140 MANUAL THERAPY 1/> REGIONS: CPT

## 2017-11-30 NOTE — FLOWSHEET NOTE
[x] [facility]. [address] [employer]  [address] [place] [address]  Phone: [phone]  Fax: [phone]  Physical Therapy Daily Treatment Note  Date: 11/30/2017  Patient: Eryn QUIÑONESKH: 1960                    MRN: [id number]  Physician: Dr Karlos Conte: Flory Polanco 150  Medical Diagnosis: R femoral stress fx, stiffness R hip, pain in R thigh   Rehab Codes: M25.651, M75.651   Onset date: 8-20-16                                                           Next 's appt.:prn   Visit# / total visits: 28/32                                       Cancels/No Shows: 0      Subjective:   Pain: [] Yes [x] No Location: B medial quad/adductor Pain Rating: (0-10 scale): 2/10 Ran a 5K Thursday without problems, then walk/ran 7.5 miles on Sat with minor L HS tightness along with foot drop after 6.5 miles, hip ER tightness. Having some continued issues with R shoulder. Pain altered Tx: [x] No [x] Yes Action:   Comments  Run/walking 4 miles a day.        Objective:  R post shoulder capsule and delt tightness. B hip flexor tightness. Pt still presents with severe forward head position. Modalities:  VMS  to L ant tib.  10 min post ex, cupping to L HS with ROM in R sidelying, ART to R post shouder capsule, delt, lat, teres major  Exercises:  Exercise Reps/ Time Weight/ Level Comments Performed   Lateral elliptical 8'  L3 Alt every 2 min x   Rev of HEP,  X     added 4 way hip with TB to HEP X   Total Gym jumps (B LE) rev 14 Added 10/3 --   TG unilateral DFs 3x15 L18 L/R      Total Gym - lat squats SL 3x15 L20       Lunge (alt) FW 2/10'   Added 10/3     Single FW lunge in place with opp LE balanced on bench 2/8   Added 10/3, Increased LOB L     FW lunge/side lunge 1/5   Added 10/3 LOB L     SLS reach to bolsters  3/3    Added 11/30  x   4 pt hip ext with opp UE flex alt 8    HEP    4 pt hip ext 10 B  HEP     SLS reach to floor with rotation B  5ea      Added 11/30   X   Standing single lunges with back LE suppoprt 2/8     Added 11/13, B   X   B TB pull down 2/12   blue   HEP inst      B TB row 2/12  blue   HEP inst       TM  10'  3.5 mph   hill program=5 HEP only   Bars - march FW/BW 6x       Bwd marching increased balance loss (requires BUE to self-recover)      Bars - SLS - balance  30 s x 3 ea             Bars - rocker board balance  2 min l            Side-stepping/monster walks 15 ft x 5 ea  blk tube  Around feet rev   Clocks 5x   Functional LE reach     Step ups 3 way 3/5 ea 4\"    rev   Heel raises/toe raises 10         --    [place]/cariocas  8/15'              BOSU - squats 20  cw/ccw, squats added squats 10/23 x   BAPS 2x15 L2 susy     Bars reach toward floor with SLS L > R   3 min    Added 9/28     Cone ex with no step, SLS   1 set ea     Took out step 11/20   x   Gastroc Stretch- wedge  1 min x 2    Added to HEP; added 9/5         slant board stretch   3 30\"    x   wall push ups  home      HEP inst       4 way hip ea  2/10 blue Added to HEP       4 way ankle with TB   rev   red   HEP inst   --   Flex/abd PREs B UEs 12 2#       eccentric calf raises  15    on step, 2 up 1 down  x   Ball walks   7    with UE assist  x    Ball UE/LE ext  15 ea    cues needed  x    Ball wall squats  2/10    added 11/30  x               Other:   VMS to L ant tib 10'  10sec on/50 sec off          Specific Instructions for next treatment:Progress EX log with emphasis on balance and strengthening, postural inst. CST/MFR, SOR  Treatment Charges: Mins Units   [x] Modalities/ES VMS  10  NC   [x] Ther Exercise 50 3   [x] Manual Therapy 10  1   [] Ther Activities         [] Aquatics         [] Vasocompression         [] NMR         Total Treatment time 79   4           Assessment: [x] Progressing toward goals. Improved balance, but continues to have LOB with single leg functional activities  [] No change. [x] Other:pt demonstrates tendency towards quad dominance.  Fewer breaks needed with ex. Needs cues for technique especial with clocks, monster walk position and step ups.        STG: (to be met in 6 treatments)  1. ? Pain: to <2/10 with running up to 10 miles  2. ? ROM: neg IP, TFL, HS, and RF stiffness. 3. ? Function: run within training program without increased pain  4. Independent with Home Exercise Programs  5. Demonstrate Knowledge of fall prevention  LTG: (to be met in 12 treatments)  1. Return to running without pain up to 16 miles or more per run.      Patient goals: To return to running injury free      Pt. Education: [x] Yes [] No [x] Reviewed Prior HEP/Ed: scap and cervical retraction exercises  Method of Education: [x] Verbal [x] Demo [] Written  Comprehension of Education:  [x] Verbalizes understanding. [x] Demonstrates understanding. [] Needs review. [x] Demonstrates/verbalizes HEP/Ed previously given.       Plan: [x] Continue per plan of care.    [] Other:      Time In: 1255                           Time Out:1410  Electronically signed by: Elvira Powers, PT

## 2017-12-07 ENCOUNTER — HOSPITAL ENCOUNTER (OUTPATIENT)
Dept: PHYSICAL THERAPY | Facility: CLINIC | Age: 57
Setting detail: THERAPIES SERIES
Discharge: HOME | End: 2017-12-07
Payer: COMMERCIAL

## 2017-12-07 PROCEDURE — 97110 THERAPEUTIC EXERCISES: CPT

## 2017-12-07 PROCEDURE — 97140 MANUAL THERAPY 1/> REGIONS: CPT

## 2017-12-07 NOTE — FLOWSHEET NOTE
[x] [facility]  [address] [facility]   Phone: [phone]  Fax: [phone]  Physical Therapy Daily Treatment Note  Date: 12/6/2017  Patient: Ze Ojeda                                    SHA: 1960                    MRN: [id number]  Physician: Dr Raul Lima: [facility] Diagnosis: R femoral stress fx, stiffness R hip, pain in R thigh   Rehab Codes: M25.651, M75.651   Onset date: 8-20-16                                                           Next 's appt.:prn   Visit# / total visits: 29/32                                       Cancels/No Shows: 0      Subjective:   Pain: [] Yes [x] No Location: B medial quad/adductor Pain Rating: (0-10 scale): 3/10 R shoulder. Ran 8 miles over the weekend at a slower pace and did well. Minor L HS irritation. No issues with foot. Getting stronger. R shoulder pain with swimming. Pain altered Tx: [x] No [x] Yes Action:   Comments  Run/walking 4 miles a day.        Objective: L ant tib strength increased to 4+/5, R post shoulder capsule and delt/infraspinatus tightness. B hip flexor tightness. Pt still presents with severe forward head position. Modalities: no  VMS  to L ant tib.  10 min post ex, cupping to L HS with ROM in R sidelying, ART to R post shouder capsule, delt, lat, teres major, infraspinatus with cupping to same with ROM, cupping to HS with ROM  Exercises:  Exercise Reps/ Time Weight/ Level Comments Performed   Lateral elliptical 8'  L3 Alt every 2 min x   Rev of HEP,  X     added 4 way hip with TB to HEP X   Total Gym jumps (B LE) rev 14 Added 10/3 --   TG unilateral DFs 3x15 L18 L/R      Total Gym - lat squats SL 3x15 L20       Lunge (alt) FW 2/10'   Added 10/3     Single FW lunge in place with opp LE balanced on bench 2/8   Added 10/3, Increased LOB L     FW lunge/side lunge 1/5   Added 10/3 LOB L     SLS reach to bolsters  3/3    Added 11/30  x   4 pt hip ext with

## 2017-12-14 ENCOUNTER — HOSPITAL ENCOUNTER (OUTPATIENT)
Dept: PHYSICAL THERAPY | Facility: CLINIC | Age: 57
Setting detail: THERAPIES SERIES
Discharge: HOME | End: 2017-12-14
Payer: COMMERCIAL

## 2017-12-14 PROCEDURE — 97140 MANUAL THERAPY 1/> REGIONS: CPT

## 2017-12-14 PROCEDURE — 97110 THERAPEUTIC EXERCISES: CPT

## 2017-12-14 NOTE — FLOWSHEET NOTE
Assessment: [x] Progressing toward goals. Improved balance, but continues to have LOB with single leg functional activities. Improved strength noted in ant tib L. Tightness in R shoulder improved with better ROM after manual treatment. [] No change. [x] Other:      STG: (to be met in 6 treatments)  1. ? Pain: to <2/10 with running up to 10 miles  2. ? ROM: neg IP, TFL, HS, and RF stiffness. 3. ? Function: run within training program without increased pain  4. Independent with Home Exercise Programs  5. Demonstrate Knowledge of fall prevention  LTG: (to be met in 12 treatments)  1. Return to running without pain up to 16 miles or more per run.      Patient goals: To return to running injury free      Pt. Education: [x] Yes [] No [x] Reviewed Prior HEP/Ed: scap and cervical retraction exercises  Method of Education: [x] Verbal [x] Demo [] Written  Comprehension of Education:  [x] Verbalizes understanding. [x] Demonstrates understanding. [] Needs review. [x] Demonstrates/verbalizes HEP/Ed previously given.       Plan: [x] Continue per plan of care.    [] Other:      Time In: 1100                          Time Out:1210  Electronically signed by: Elvira Powers, PT

## 2017-12-20 ENCOUNTER — HOSPITAL ENCOUNTER (OUTPATIENT)
Dept: PHYSICAL THERAPY | Facility: CLINIC | Age: 57
Setting detail: THERAPIES SERIES
Discharge: HOME | End: 2017-12-20
Payer: COMMERCIAL

## 2017-12-20 PROCEDURE — 97140 MANUAL THERAPY 1/> REGIONS: CPT

## 2017-12-20 PROCEDURE — 97110 THERAPEUTIC EXERCISES: CPT

## 2017-12-20 PROCEDURE — 97112 NEUROMUSCULAR REEDUCATION: CPT

## 2017-12-20 NOTE — FLOWSHEET NOTE
[x] [facility]. [address] [place] [address] [employer]  [address] [place] [address]  Phone: [phone]  Fax: [phone]  Physical Therapy Daily Treatment Note/Re-eval  Date: 12/20/2017  Patient: Rose Marie Serrano                                    LZY: 1960                    MRN: [id number]  Physician: Dr Rodrigo Rivasight: [facility] Diagnosis: R femoral stress fx, stiffness R hip, pain in R thigh   Rehab Codes: M25.651, M75.651   Onset date: 8-20-16                                                           Next 's appt.:prn   Visit# / total visits: 31/401                                       Cancels/No Shows: 0      Subjective:   Pain: [] Yes [x] No Location: R shoulder, L side of lower face and neck (ant/lateral)  Pain Rating: (0-10 scale): minimal  Pain altered Tx: [x] No [x] Yes Action:   Comments  Run/walking 4 miles a day.        Objective: Severe tightness in L maxiallary and mandibular facial soft tissue from multiple surgeries. Decreased mobility in the maxilla, vomer, L pterygoids, L zygoma, and ant/lateral cervical and hyoid soft tissue. Jaw opening 25% currently with mandibular teeth on L missing  Treatment:  1. ROM R shoulder with cupping. (wall slides, AROM, table slides) - ex  2. MFR/ART to R scap structures, teres shabana/minor, lat, rhomboid, infraspinatus, post capsule and deltoid, L suprahyoid, facial muscles  3. CST - maxilla, vomer, pterygoid, zygoma releases  4. Taping to R scap - NMR        Specific Instructions for next treatment:Progress EX log with emphasis on balance and strengthening, postural inst. CST/MFR, SOR  Treatment Charges: Mins Units   [] Modalities/ES VMS       [x] Ther Exercise 15 2   [x] Manual Therapy 45  3   [] Ther Activities         [] Aquatics         [] Vasocompression         [x] NMR  10    1   Total Treatment time 70 5           Assessment:     STG: (to be met in 10 treatments)    1. Increase mandibular opening to 35-50%%  2. Increase soft tissue tightness any  3. Improve  ROM R shoulder    LTG: (to be met in 18+ treatments)    1. Increase jaw opening to 75% or more  2. Maximize cervical, hyoid and facial mobility to allow for improved swallowing, chewing, having teeth implants  3. Maximize shoulder mobility. Patient goals:Improve facial mobility to help with smiling, swallowing and prepping for getting teeth, chewing      Pt. Education: [x] Yes [] No [x] Reviewed facial and shouder ex  Method of Education: [x] Verbal [x] Demo [] Written  Comprehension of Education:  [x] Verbalizes understanding. [x] Demonstrates understanding. [] Needs review. [x] Demonstrates/verbalizes HEP/Ed previously given.       Plan: [x] Continue per plan of care.    [] Other:      Time In: 0930                          Time Out:1050  Electronically signed by: Krishna Umana, PT

## 2017-12-21 ENCOUNTER — HOSPITAL ENCOUNTER (OUTPATIENT)
Dept: PHYSICAL THERAPY | Facility: CLINIC | Age: 57
Setting detail: THERAPIES SERIES
Discharge: HOME | End: 2017-12-21
Payer: COMMERCIAL

## 2017-12-21 PROCEDURE — 97140 MANUAL THERAPY 1/> REGIONS: CPT

## 2017-12-21 PROCEDURE — 97110 THERAPEUTIC EXERCISES: CPT

## 2017-12-21 NOTE — FLOWSHEET NOTE
reach to floor with rotation B Lang Sanchez      Added 11/30      Standing single lunges with back LE suppoprt 2/8     Added 11/13, B   X   B TB pull down 2/12   blue   HEP inst      B TB row 2/12  blue   HEP inst       TM  10'  3.5 mph   hill program=5 HEP only   Bars - march FW/BW 6x       Bwd marching increased balance loss (requires BUE to self-recover)      Bars - SLS - balance  30 s x 3 ea             Bars - rocker board balance  2 min l            Side-stepping/monster walks 15 ft x 5 ea  blk tube  Around feet rev   Clocks 5x   Functional LE reach     Step ups 3 way 3/5 ea 4\"    rev   Heel raises/toe raises 10         --    [place]/cariocas  8/15'              BOSU - squats 20  cw/ccw, squats added squats 10/23 x   BAPS 2x15 L2 susy     Bars reach toward floor with SLS L > R   3 min    Added 9/28     [facility]one ex with no step, SLS   1 set ea     Took out step 11/20   x   Gastroc Stretch- wedge  1 min x 2    Added to HEP; added 9/5         slant board stretch   3 30\"        wall push ups  home      HEP inst       4 way hip ea  2/10 blue Added to HEP       4 way ankle with TB   rev   red   HEP inst   --   Flex/abd PREs B UEs 12 2#       eccentric calf raises  20    on step, 2 up 1 down  x   Ball walks  6   Added push ups for all  x    Ball UE/LE ext  15 ea    cues needed  x    Ball wall squats  2/10    added 11/30      Squats on BOSU  10      x   Other:             Specific Instructions for next treatment:Progress EX log with emphasis on balance and strengthening,  Treatment Charges: Mins Units   [] Modalities/ES VMS       [x] Ther Exercise 45 3   [x] Manual Therapy 30  2   [] Ther Activities         [] Aquatics         [] Vasocompression         [] NMR         Total Treatment time 76   5           Assessment: [x] Progressing toward goals. Improved balance, but continues to have LOB with single leg functional activities. Improved strength noted in ant tib L.  Tightness in R shoulder improved with better ROM after manual

## 2017-12-27 ENCOUNTER — HOSPITAL ENCOUNTER (OUTPATIENT)
Dept: PHYSICAL THERAPY | Facility: CLINIC | Age: 57
Setting detail: THERAPIES SERIES
Discharge: HOME | End: 2017-12-27
Payer: COMMERCIAL

## 2017-12-27 PROCEDURE — 97140 MANUAL THERAPY 1/> REGIONS: CPT

## 2017-12-28 ENCOUNTER — HOSPITAL ENCOUNTER (OUTPATIENT)
Dept: PHYSICAL THERAPY | Facility: CLINIC | Age: 57
Setting detail: THERAPIES SERIES
Discharge: HOME | End: 2017-12-28
Payer: COMMERCIAL

## 2017-12-28 PROCEDURE — 97140 MANUAL THERAPY 1/> REGIONS: CPT

## 2017-12-28 PROCEDURE — 97110 THERAPEUTIC EXERCISES: CPT

## 2017-12-28 PROCEDURE — 97112 NEUROMUSCULAR REEDUCATION: CPT

## 2017-12-28 NOTE — FLOWSHEET NOTE
[x] [facility]5 [facility] [employer]  [address] [place] [address]  Phone: [phone]  Fax: [phone]  Physical Therapy Daily Treatment Note  Date: 12/28/2017  Patient: Afsaneh Reyes                                    GYW: 1960                    MRN: [id number]  Physician: Dr Fidel Vaz: [facility] Diagnosis: R femoral stress fx, stiffness R hip, pain in R thigh   Rehab Codes: M25.651, M75.651   Onset date: 8-20-16                                                           Next 's appt.:prn   Visit# / total visits: 31/42                                       Cancels/No Shows: 0      Subjective:   Pain: [] Yes [x] No Location: B medial quad/adductor Pain Rating: (0-10 scale): 2/10  L HS cramping and tightness. R scapular and post shoulder tightness. Just ran 5 miles on a TM and felt really tired due to the humidity and heat in the gym. Did some strengthening after so feeling fatigue today. Pain altered Tx: [x] No [x] Yes Action:   Comments  Run/walking 4 miles a day.        Objective: L ant tib strength increased to 4+/5, R post shoulder capsule and delt/infraspinatus tightness. B hip flexor tightness. L HS and calf tightness. Pt still presents with severe forward head position.  Modalities: IDNto L HS, R scap, teres major, lat, post delt with plunging, ART to L HS, cupping to L HS,  R post shoulder, deltoid, lat, teres major, infraspinatus with ROM  Exercises:  Exercise Reps/ Time Weight/ Level Comments Performed   Lateral elliptical 10'  L3 Alt every 2 min     Rev of HEP,  X     added 4 way hip with TB to HEP X   Total Gym jumps (B LE) rev 14 Added 10/3 --   TG unilateral DFs 3x15 L18 L/R      Total Gym - lat squats SL 3x15 L20       Lunge (alt) FW 2/10'   Added 10/3     Single FW lunge in place with opp LE balanced on bench 2/8       FW lunge onto BOSU alt 2/10   Added 10/3 LOB L     SLS reach to bolsters  3/3    Added 11/30  x   4 pt hip ext with opp UE flex alt 10  1# UEs  HEP  x   4 pt hip ext 10 B  HEP     SLS reach to floor with rotation B  5ea      Added 11/30      Standing single lunges with back LE suppoprt 2/8     Added 11/13, B      B TB pull down 2/12   blue   HEP inst      B TB row 2/12  blue   HEP inst       TM  10'  3.5 mph   hill program=5 HEP only   Bars - march FW/BW 6x       Bwd marching increased balance loss (requires BUE to self-recover)      Bars - SLS - balance  30 s x 3 ea             Bars - rocker board balance  2 min l            Side-stepping/monster walks 15 ft x 5 ea  blk tube  Around feet rev   Clocks 5x   Functional LE reach     Step ups 3 way 3/5 ea 4\"    rev   Heel raises/toe raises 10         --    [place]  8/15'              BOSU - squats 4 min   cw/ccw, squats added squats, balance only 12/28 x   BAPS 2x15 L2 susy     Bars reach toward floor with SLS L > R   3 min    Added 9/28     Cone ex with no step, SLS   1 set ea     Took out step 11/20   x   Gastroc Stretch- wedge  1 min x 2    Added to HEP; added 9/5         slant board stretch   3 30\"        wall push ups  home      HEP inst       4 way hip ea  2/10 blue Added to HEP       4 way ankle with TB   rev   red   HEP inst   --   Flex/abd PREs B UEs 12 2#       eccentric calf raises  20    on step, 2 up 1 down  x   Ball walks  6   Added push ups for all  x    Ball UE/LE ext  15 ea    cues needed  x    Ball wall squats  2/10    added 11/30      Squats on BOSU  10        Other: NMR - taping to L HS        Specific Instructions for next treatment:Progress EX log with emphasis on balance and strengthening,  Treatment Charges: Mins Units   [] Modalities/ES VMS       [x] Ther Exercise 20 1   [x] Manual Therapy 35  2   [] Ther Activities         [] Aquatics         [] Vasocompression         [x] NMR   10   1   Total Treatment time 65   5           Assessment: [x] Progressing toward goals. Improved balance and strength noted overall.  Pt still struggles to correct

## 2018-01-03 ENCOUNTER — HOSPITAL ENCOUNTER (OUTPATIENT)
Dept: PHYSICAL THERAPY | Facility: CLINIC | Age: 58
Setting detail: THERAPIES SERIES
Discharge: HOME | End: 2018-01-03
Payer: COMMERCIAL

## 2018-01-03 PROCEDURE — 97140 MANUAL THERAPY 1/> REGIONS: CPT

## 2018-01-03 NOTE — FLOWSHEET NOTE
treatments)     1. Increase mandibular opening to 35-50%%  2. Increase soft tissue tightness any  3. Improve  ROM R shoulder     LTG: (to be met in 18+ treatments)     1. Increase jaw opening to 75% or more  2. Maximize cervical, hyoid and facial mobility to allow for improved swallowing, chewing, having teeth implants  3. Maximize shoulder mobility.     Patient goals:Improve facial mobility to help with smiling, swallowing and prepping for getting teeth, chewing      Pt. Education: [x] Yes [] No [x] Reviewed facial and shouder ex  Method of Education: [x] Verbal [x] Demo [] Written  Comprehension of Education:  [x] Verbalizes understanding. [x] Demonstrates understanding. [] Needs review. [x] Demonstrates/verbalizes HEP/Ed previously given.       Plan: [x] Continue per plan of care.    [] Other:      Time In: 1500                         Time Out:1600  Electronically signed by: Walter Ferreira PT

## 2018-01-04 ENCOUNTER — HOSPITAL ENCOUNTER (OUTPATIENT)
Dept: PHYSICAL THERAPY | Facility: CLINIC | Age: 58
Setting detail: THERAPIES SERIES
Discharge: HOME | End: 2018-01-04
Payer: COMMERCIAL

## 2018-01-04 PROCEDURE — 97110 THERAPEUTIC EXERCISES: CPT

## 2018-01-04 PROCEDURE — 97112 NEUROMUSCULAR REEDUCATION: CPT

## 2018-01-04 PROCEDURE — 97140 MANUAL THERAPY 1/> REGIONS: CPT

## 2018-01-04 NOTE — FLOWSHEET NOTE
HEP     SLS reach to floor with rotation B  5ea      Added 11/30      Standing single lunges with back LE suppoprt 2/8     Added 11/13, B      B TB pull down 2/12   blue   HEP inst      B TB row 2/12  blue   HEP inst       TM  10'  3.5 mph   hill program=5 HEP only   Bars - march FW/BW 6x       Bwd marching increased balance loss (requires BUE to self-recover)      Bars - SLS - balance  30 s x 3 ea             Bars - rocker board balance  2 min l            Side-stepping/monster walks 15 ft x 5 ea  blk tube  Around feet rev   Clocks 5x   Functional LE reach     Step ups 3 way 3/5 ea 4\"    rev   Heel raises/toe raises 10         --    [place]  8/15'              BOSU - squats 4 min   cw/ccw, squats added squats, balance only 12/28 x   BAPS 2x15 L2 susy     Bars reach toward floor with SLS L > R   3 min    Added 9/28     Cone ex with no step, SLS   1 set ea     Took out step 11/20   x   Gastroc Stretch- wedge  1 min x 2    Added to HEP; added 9/5         slant board stretch   3 30\"        wall push ups  home      HEP inst       4 way hip ea  2/10 blue Added to HEP       4 way ankle with TB   rev   red   HEP inst   --   Flex/abd PREs B UEs 12 2#       eccentric calf raises  20    on step, 2 up 1 down  x   Ball walks  6   Added push ups for all  x    Ball UE/LE ext  15 ea    cues needed  x    Ball wall squats  2/10    added 11/30      Squats on BOSU  10         Other: NMR - taping to L HS        Specific Instructions for next treatment:Progress EX log with emphasis on balance and strengthening,  Treatment Charges: Mins Units   [] Modalities/ES VMS       [x] Ther Exercise 30 2   [x] Manual Therapy 20  1   [] Ther Activities         [] Aquatics         [] Vasocompression         [x] NMR 10   1   Total Treatment time 60   4           Assessment: [x] Progressing toward goals. Improved balance and strength noted overall. Pt with LOB with dynamic balance activities.  Tired today due to working out prior to treatment. Held on some ex. Decreased tightness post treatment in L HS, R post shoulder. [] No change. [] Other:      STG: (to be met in 6 treatments)  1. ? Pain: to <2/10 with running up to 10 miles  2. ? ROM: neg IP, TFL, HS, and RF stiffness. 3. ? Function: run within training program without increased pain  4. Independent with Home Exercise Programs  5. Demonstrate Knowledge of fall prevention  LTG: (to be met in 12 treatments)  1. Return to running without pain up to 16 miles or more per run.      Patient goals: To return to running injury free      Pt. Education: [x] Yes [] No [x] Reviewed Prior HEP/Ed: scap and cervical retraction exercises  Method of Education: [x] Verbal [x] Demo [] Written  Comprehension of Education:  [x] Verbalizes understanding. [x] Demonstrates understanding. [] Needs review. [x] Demonstrates/verbalizes HEP/Ed previously given.       Plan: [x] Continue per plan of care.    [] Other:      Time In:  3792                        [facility] PYP:[id number]  Electronically signed by: Ye Mcleod, PT

## 2018-01-08 ENCOUNTER — HOSPITAL ENCOUNTER (OUTPATIENT)
Dept: PHYSICAL THERAPY | Facility: CLINIC | Age: 58
Setting detail: THERAPIES SERIES
Discharge: HOME | End: 2018-01-08
Payer: COMMERCIAL

## 2018-01-08 PROCEDURE — 97140 MANUAL THERAPY 1/> REGIONS: CPT

## 2018-01-08 PROCEDURE — 97112 NEUROMUSCULAR REEDUCATION: CPT

## 2018-01-08 NOTE — FLOWSHEET NOTE
[x] [facility]  [address]   Phone: [phone]  Fax: [phone]  Physical Therapy Daily Treatment Note/Re-eval  Date: 1/8/2017  Patient: Moreno Lorenzo                                    FKT: 1960                    MRN: [id number]  Physician: Dr Clifton Habermann: Flory Polanco 150  Medical Diagnosis: R femoral stress fx, stiffness R hip, pain in R thigh   Rehab Codes: M25.651, M75.651   Onset date: 8-20-16                                                           Next 's appt.:prn   Visit# / total visits: 33/60                                      Cancels/No Shows: 0      Subjective:   Pain: [] Yes [x] No Location: R shoulder, L side of lower face and neck (ant/lateral)  Pain Rating: (0-10 scale): 2/10 L facial tightness, R shoulder. Pt reports he has an appointment later today with the dentist to discuss going forward with his teeth implants. Feeling Pain altered Tx: [x] No [x] Yes Action:   Comments        Objective: Severe tightness in L maxiallary and mandibular facial soft tissue from multiple surgeries. Decreased mobility in the maxilla, vomer, L pterygoids, L zygoma, and ant/lateral cervical and hyoid soft tissue. Jaw opening 40-50% currently with mandibular teeth  L missing  Treatment:  1. MFR/ART  L suprahyoid, facial muscles intraorally and externally, masseter, and pterygoid, fluid/LDT L mandible  2. CST - OCB release, no maxilla release for all directions, no lateral pterygoid release B   3. No ART to masseter, pterygoid with jaw opening  4. IDN, ART to R levator, teres major, lats, infraspinatus, rhomboid   5.  Taping to R scap - NMR         Specific Instructions for next treatment: CST/MFR, SOR  Treatment Charges: Mins Units   [] Modalities/ES VMS       [] Ther Exercise       [x] Manual Therapy 60 4   [] Ther Activities         [] Aquatics         [] Vasocompression         [x] NMR  10  1   Total Treatment time 70

## 2018-01-10 ENCOUNTER — HOSPITAL ENCOUNTER (OUTPATIENT)
Dept: PHYSICAL THERAPY | Facility: CLINIC | Age: 58
Setting detail: THERAPIES SERIES
Discharge: HOME | End: 2018-01-10
Payer: COMMERCIAL

## 2018-01-10 PROCEDURE — 97140 MANUAL THERAPY 1/> REGIONS: CPT

## 2018-01-10 PROCEDURE — 97110 THERAPEUTIC EXERCISES: CPT

## 2018-01-10 NOTE — FLOWSHEET NOTE
miles  2. ? ROM: neg IP, TFL, HS, and RF stiffness. 3. ? Function: run within training program without increased pain  4. Independent with Home Exercise Programs  5. Demonstrate Knowledge of fall prevention  LTG: (to be met in 12 treatments)  1. Return to running without pain up to 16 miles or more per run.      Patient goals: To return to running injury free      Pt. Education: [x] Yes [] No [x] Reviewed Prior HEP/Ed: scap and cervical retraction exercises  Method of Education: [x] Verbal [x] Demo [] Written  Comprehension of Education:  [x] Verbalizes understanding. [x] Demonstrates understanding. [] Needs review. [x] Demonstrates/verbalizes HEP/Ed previously given.       Plan: [x] Continue per plan of care.    [] Other:      Time In:  1343                        [facility] [id number]  Electronically signed by: Juan Ramon Esqueda PT

## 2018-01-15 ENCOUNTER — HOSPITAL ENCOUNTER (OUTPATIENT)
Dept: PHYSICAL THERAPY | Facility: CLINIC | Age: 58
Setting detail: THERAPIES SERIES
Discharge: HOME | End: 2018-01-15
Payer: COMMERCIAL

## 2018-01-15 NOTE — FLOWSHEET NOTE
[] [facility]        Outpatient Physical                Therapy       [address]       Phone: [phone]       Fax: [phone] [] [facility] [address] [facility]       Phone: [phone]       Fax: [phone] [x] Chang.  [address] [place] [address]      Phone: [phone]      Fax:  [phone]     Physical Therapy Cancel/No Show note    Date: 1/15/2018  Patient: Koby Richey  : 1960  MRN: [id number]    Cancels/No Shows to date:     For today's appointment patient:  [x]  Cancelled  []  Rescheduled appointment  []  No-show     Reason given by patient:  [x]  Patient ill  []  Conflicting appointment  []  No transportation    []  Conflict with work  []  No reason given  []  Weather related  []  Other:      Comments:      [x]  Next appointment was confirmed    Electronically signed by: Keshav Ardon

## 2018-01-17 ENCOUNTER — HOSPITAL ENCOUNTER (OUTPATIENT)
Dept: PHYSICAL THERAPY | Facility: CLINIC | Age: 58
Setting detail: THERAPIES SERIES
Discharge: HOME | End: 2018-01-17
Payer: COMMERCIAL

## 2018-01-17 PROCEDURE — 97112 NEUROMUSCULAR REEDUCATION: CPT

## 2018-01-17 PROCEDURE — 97110 THERAPEUTIC EXERCISES: CPT

## 2018-01-17 PROCEDURE — 97140 MANUAL THERAPY 1/> REGIONS: CPT

## 2018-01-22 ENCOUNTER — HOSPITAL ENCOUNTER (OUTPATIENT)
Dept: PHYSICAL THERAPY | Facility: CLINIC | Age: 58
Setting detail: THERAPIES SERIES
Discharge: HOME | End: 2018-01-22
Payer: COMMERCIAL

## 2018-01-22 PROCEDURE — 97140 MANUAL THERAPY 1/> REGIONS: CPT

## 2018-01-22 NOTE — FLOWSHEET NOTE
[] Vasocompression         [] NMR       Total Treatment time 60 4           Assessment: Improved jaw opening post treatment. Decreased tightness L side of face, some drainage noted with LDT/MFR to L mandible.      STG: (to be met in 10 treatments)     1. Increase mandibular opening to 35-50%%  2. Increase soft tissue tightness any  3. Improve  ROM R shoulder     LTG: (to be met in 18+ treatments)     1. Increase jaw opening to 75% or more  2. Maximize cervical, hyoid and facial mobility to allow for improved swallowing, chewing, having teeth implants  3. Maximize shoulder mobility.     Patient goals:Improve facial mobility to help with smiling, swallowing and prepping for getting teeth, chewing      Pt. Education: [x] Yes [] No [x] Reviewed facial and shouder ex  Method of Education: [x] Verbal [x] Demo [] Written  Comprehension of Education:  [x] Verbalizes understanding. [x] Demonstrates understanding. [] Needs review. [x] Demonstrates/verbalizes HEP/Ed previously given.       Plan: [x] Continue per plan of care.    [] Other:      Time In: 1400                        Time Out:1500  Electronically signed by: Kandis Alvarado, PT

## 2018-01-24 ENCOUNTER — HOSPITAL ENCOUNTER (OUTPATIENT)
Dept: PHYSICAL THERAPY | Facility: CLINIC | Age: 58
Setting detail: THERAPIES SERIES
Discharge: HOME | End: 2018-01-24
Payer: COMMERCIAL

## 2018-01-24 NOTE — FLOWSHEET NOTE
[] [facility]        Outpatient Physical                Therapy       [address]       Phone: [phone]       Fax: [phone] [] [facility]       [facility] at [address] [place] [address]       Phone: [phone]       Fax: [phone] [] [facility]     [address] [place] [address]      Phone: [phone]      Fax:  [phone]     Physical Therapy Cancel/No Show note    Date: 2018  Patient: Janeth Romero  : 1960  MRN: [id number]    Cancels/No Shows to date: 1    For today's appointment patient:  [x]  Cancelled  []  Rescheduled appointment  []  No-show     Reason given by patient:  []  Patient ill  []  Conflicting appointment  []  No transportation    []  Conflict with work  []  No reason given  []  Weather related  [x]  Other:     Comments:  Post-op    Electronically signed by: Danitza Feldman PT

## 2018-01-29 ENCOUNTER — HOSPITAL ENCOUNTER (OUTPATIENT)
Dept: PHYSICAL THERAPY | Facility: CLINIC | Age: 58
Setting detail: THERAPIES SERIES
Discharge: HOME | End: 2018-01-29
Payer: COMMERCIAL

## 2018-01-29 PROCEDURE — 97140 MANUAL THERAPY 1/> REGIONS: CPT

## 2018-01-31 ENCOUNTER — HOSPITAL ENCOUNTER (OUTPATIENT)
Dept: PHYSICAL THERAPY | Facility: CLINIC | Age: 58
Setting detail: THERAPIES SERIES
Discharge: HOME | End: 2018-01-31
Payer: COMMERCIAL

## 2018-01-31 PROCEDURE — 97110 THERAPEUTIC EXERCISES: CPT

## 2018-01-31 PROCEDURE — 97140 MANUAL THERAPY 1/> REGIONS: CPT

## 2018-01-31 NOTE — FLOWSHEET NOTE
TB row 2/12  blue   HEP inst       TM  10'  3.5 mph   hill program=5 HEP only   Standing lunge with UE and upper trunk rot with TB 2/10   black        Bars - SLS - balance  30 s x 3 ea             Bars - rocker board balance  2 min l            Side-stepping/monster walks 15 ft x 5 ea  blk tube  Around feet rev   Clocks 5x   Functional LE reach     Step ups 3 way 3/5 ea 4\"    rev   Heel raises/toe raises 10         --    [place]  8/15'              BOSU - squats 4 min   cw/ccw, squats added squats, balance only 12/28    Prone eccentric manual HS 10 L   x   Ball roll with SL bridge supine   2/10   B   x   Cone ex with no step, SLS 3 sets ea     barefoot  x   Gastroc Stretch- wedge  1 min x 2    Added to HEP; added 9/5         slant board stretch   3 30\"        wall push ups  home      HEP inst       4 way hip ea  2/10 blue Added to HEP       4 way ankle with TB   rev   red   HEP inst   --   Flex/abd PREs B UEs 12 2#       eccentric calf raises  20    on step, 2 up 1 down     Ball walks  6   Added push ups for all. Cues needed  x    Ball UE/LE ext  15 ea    cues needed  x    Ball wall squats  2/10    added 11/30                Other: NMR - taping to R scap,  L HS  tape given to pt      Specific Instructions for next treatment:Progress EX log with emphasis on balance and strengthening, barefoot  Treatment Charges: Mins Units   [] Modalities/ES VMS       [x] Ther Exercise 45 3   [x] Manual Therapy 30 2   [] Ther Activities         [] Aquatics         [] Vasocompression         [] NMR      Total Treatment time 76  5           Assessment: [x] Progressing toward goals. Improved psoas flexibility post treatment. Increased LOB with doing ex barefoot  [] No change. [] Other:      STG: (to be met in 6 treatments)  1. ? Pain: to <2/10 with running up to 10 miles  2. ? ROM: neg IP, TFL, HS, and RF stiffness. 3. ? Function: run within training program without increased pain  4.  Independent with Home Exercise

## 2018-02-05 ENCOUNTER — HOSPITAL ENCOUNTER (OUTPATIENT)
Dept: PHYSICAL THERAPY | Facility: CLINIC | Age: 58
Setting detail: THERAPIES SERIES
Discharge: HOME | End: 2018-02-05
Payer: COMMERCIAL

## 2018-02-05 PROCEDURE — 97750 PHYSICAL PERFORMANCE TEST: CPT

## 2018-02-05 PROCEDURE — 97110 THERAPEUTIC EXERCISES: CPT

## 2018-02-07 ENCOUNTER — HOSPITAL ENCOUNTER (OUTPATIENT)
Dept: PHYSICAL THERAPY | Facility: CLINIC | Age: 58
Setting detail: THERAPIES SERIES
Discharge: HOME | End: 2018-02-07
Payer: COMMERCIAL

## 2018-02-07 PROCEDURE — 97140 MANUAL THERAPY 1/> REGIONS: CPT

## 2018-02-07 NOTE — FLOWSHEET NOTE
[x] Manual Therapy 60 4   [] Ther Activities         [] Aquatics         [] Vasocompression         [] NMR       Total Treatment time 60 4           Assessment: Decreased tightness L side of face. Jaw opening to 4 mm post treatment. Pt reported he had more feeling post treatment and face felt more connected.     STG: (to be met in 10 treatments)     1. Increase mandibular opening to 35-50%%  2. Increase soft tissue tightness any  3. Improve  ROM R shoulder     LTG: (to be met in 18+ treatments)     1. Increase jaw opening to 75% or more  2. Maximize cervical, hyoid and facial mobility to allow for improved swallowing, chewing, having teeth implants  3. Maximize shoulder mobility.     Patient goals:Improve facial mobility to help with smiling, swallowing and prepping for getting teeth, chewing      Pt. Education: [x] Yes [] No [x] Reviewed facial and shouder ex  Method of Education: [x] Verbal [x] Demo [] Written  Comprehension of Education:  [x] Verbalizes understanding. [x] Demonstrates understanding. [] Needs review. [x] Demonstrates/verbalizes HEP/Ed previously given.       Plan: [x] Continue per plan of care.    [] Other:      Time In: 3484                       [facility] CBE:[id number]  Electronically signed by: Karla Vasquez PT

## 2018-02-12 ENCOUNTER — HOSPITAL ENCOUNTER (OUTPATIENT)
Dept: PHYSICAL THERAPY | Facility: CLINIC | Age: 58
Setting detail: THERAPIES SERIES
Discharge: HOME | End: 2018-02-12
Payer: COMMERCIAL

## 2018-02-12 PROCEDURE — 97110 THERAPEUTIC EXERCISES: CPT

## 2018-02-12 NOTE — FLOWSHEET NOTE
Knowledge of fall prevention  LTG: (to be met in 12 treatments)  1. Return to running without pain up to 16 miles or more per run.      Patient goals: To return to running injury free    Pt. Education:  [x] Yes  [] No  [x] Reviewed Prior HEP/Ed  Method of Education: [x] Verbal  [] Demo  [] Written (added squats with arizmendi machine, reviewed need to stretch hip flexors and work hip extensors prior to run, lunge walk with a narrow stance and wall for balance assist if needed)   Comprehension of Education:  [] Verbalizes understanding. [] Demonstrates understanding. [] Needs review. [] Demonstrates/verbalizes HEP/Ed previously given. Plan: [x] Continue per plan of care.  1x/wk   [] Other:      Time In:1420            Time Out: 1520    Electronically signed by:  Anjeilca Vela, PT

## 2018-02-14 ENCOUNTER — HOSPITAL ENCOUNTER (OUTPATIENT)
Dept: PHYSICAL THERAPY | Facility: CLINIC | Age: 58
Setting detail: THERAPIES SERIES
Discharge: HOME | End: 2018-02-14
Payer: COMMERCIAL

## 2018-02-14 PROCEDURE — 97140 MANUAL THERAPY 1/> REGIONS: CPT

## 2018-02-14 NOTE — FLOWSHEET NOTE
[x] [address] [employer]  [address] [place] [address]  Phone: [phone]  Fax: [phone]  Physical Therapy Daily Treatment Note/Re-eval  Date: 2/14/2017  Patient: Floresita Edwards                                    KRC: 1960                    MRN: [id number]  Physician: Dr Cortés Filler: Flory Brantley  Medical Diagnosis: R femoral stress fx, stiffness R hip, pain in R thigh   Rehab Codes: M25.651, M75.651   Onset date: 8-20-16                                                           Next 's appt.:prn   Visit# / total visits: 39/60                                     Cancels/No Shows: 0      Subjective:   Pain: [] Yes [x] No Location: R shoulder, L side of lower face and neck (ant/lateral)  Pain Rating: (0-10 scale): 2/10 L facial tightness, states he has some numbness at the top of the lip.    Feeling Pain altered Tx: [] No [x] Yes Action: manual therapy   Comments        Objective: Soreness intraorally from new incision sites and scar tissue. 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 Decreased mobility in the maxilla, vomer, L pterygoids, L zygoma, and ant/lateral cervical and hyoid soft tissue. Jaw opening 3.0 mm currently with mandibular teeth L missing  Treatment:  1. MFR/ART  L suprahyoid, facial muscles gently intraorally and externally, masseter, and pterygoid, L mandible  2. CST - OCB release, no maxilla release for all directions,  lateral pterygoid release L, added cranial vault release, parietal release, temp release with sphenobasilar release, hyoid release    3. ART to masseter, pterygoid with jaw opening, TMJ/mandibular release B, inferior and anterior  4. 1 PT, maxillar stabilization with mandibular release  5.  IDN, to TMJ, masseters, homeostatic facial points.           Specific Instructions for next treatment: CST/MFR, SOR  Treatment Charges: Mins Units   [] Modalities/ES VMS       [] Ther Exercise       [x] Manual Therapy 60 4   [] Ther Activities         [] Aquatics         [] Vasocompression         [] NMR       Total Treatment time 60 4           Assessment: Decreased tightness L side of face. Jaw opening to 4 mm post treatment.  Pt reported his jaw felt better after treatment.     STG: (to be met in 10 treatments)     1. Increase mandibular opening to 35-50%  2. Increase soft tissue tightness any  3. Improve  ROM R shoulder     LTG: (to be met in 18+ treatments)     1. Increase jaw opening to 75% or more  2. Maximize cervical, hyoid and facial mobility to allow for improved swallowing, chewing, having teeth implants  3. Maximize shoulder mobility.     Patient goals:Improve facial mobility to help with smiling, swallowing and prepping for getting teeth, chewing      Pt. Education: [x] Yes [] No [x] Reviewed facial and shouder ex  Method of Education: [x] Verbal [x] Demo [] Written  Comprehension of Education:  [x] Verbalizes understanding. [x] Demonstrates understanding. [] Needs review. [x] Demonstrates/verbalizes HEP/Ed previously given.       Plan: [x] Continue per plan of care.    [] Other:      Time In: 1530                       Time Out:1630  Electronically signed by: Lizzy Hayward, PT

## 2018-02-19 ENCOUNTER — HOSPITAL ENCOUNTER (OUTPATIENT)
Dept: PHYSICAL THERAPY | Facility: CLINIC | Age: 58
Setting detail: THERAPIES SERIES
Discharge: HOME | End: 2018-02-19
Payer: COMMERCIAL

## 2018-02-19 PROCEDURE — 97140 MANUAL THERAPY 1/> REGIONS: CPT

## 2018-02-19 NOTE — FLOWSHEET NOTE
[x] [facility] [employer]  [address] [facility]  Phone: [phone]  Fax: [phone]  Physical Therapy Daily Treatment Note  Date: 2/19/2017  Patient: Kellie Gunn                                    HTE: 1960                    MRN: [id number]  Physician: Dr Monica Merino: Jes Leyva  Medical Diagnosis: R femoral stress fx, stiffness R hip, pain in R thigh   Rehab Codes: M25.651, M75.651   Onset date: 8-20-16                                                           Next Dr's appt.:prn   Visit# / total visits: 40/60                                     Cancels/No Shows: 0      Subjective:   Pain: [] Yes [x] No Location: R shoulder, L side of lower face and neck (ant/lateral)  Pain Rating: (0-10 scale): 2/10 Pt notes feeling more \"fullness\" in upper and lower lip since last Fri. Unsure of why.     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 Decreased mobility in the maxilla, vomer, L pterygoids, L zygoma, and ant/lateral cervical and hyoid soft tissue. Jaw opening 3.0 mm currently with mandibular teeth L missing  Treatment:  1. LDT supraclavicular, axillary, facial, SCM  1. MFR/ART  L suprahyoid, facial muscles gently intraorally and externally, masseter, and pterygoid, L mandible  2. CST - OCB release, no maxilla release for all directions,  lateral pterygoid release L, added cranial vault release, parietal release, temp release with sphenobasilar release, hyoid release , mandibular release  3. No ART to masseter, pterygoid with jaw opening, TMJ/mandibular release B, inferior and anterior  4. 1 PT, maxillar stabilization with mandibular release  5.  IDN, to TMJ, masseters, homeostatic facial points.           Specific Instructions for next treatment: CST/MFR, SOR  Treatment Charges: Mins Units   [] Modalities/ES VMS       [] Ther Exercise       [x] Manual Therapy 60 4   [] Ther Activities         [] Aquatics         [] Vasocompression         [] NMR       Total Treatment time 60 4           Assessment: Decreased tightness L side of face. Jaw opening to 4 mm post treatment.  Pt reported improved feeling of his face post treatment     STG: (to be met in 10 treatments)     1. Increase mandibular opening to 35-50%  2. Increase soft tissue tightness any  3. Improve  ROM R shoulder     LTG: (to be met in 18+ treatments)     1. Increase jaw opening to 75% or more  2. Maximize cervical, hyoid and facial mobility to allow for improved swallowing, chewing, having teeth implants  3. Maximize shoulder mobility.     Patient goals:Improve facial mobility to help with smiling, swallowing and prepping for getting teeth, chewing      Pt. Education: [x] Yes [] No [x] Reviewed facial and shouder ex  Method of Education: [x] Verbal [x] Demo [] Written  Comprehension of Education:  [x] Verbalizes understanding. [x] Demonstrates understanding. [] Needs review. [x] Demonstrates/verbalizes HEP/Ed previously given.       Plan: [x] Continue per plan of care.    [] Other:      Time In: 1530                       Time Out:1630  Electronically signed by: Trinidad Lagos, PT

## 2018-02-21 ENCOUNTER — HOSPITAL ENCOUNTER (OUTPATIENT)
Dept: PHYSICAL THERAPY | Facility: CLINIC | Age: 58
Setting detail: THERAPIES SERIES
Discharge: HOME | End: 2018-02-21
Payer: COMMERCIAL

## 2018-02-21 PROCEDURE — 97140 MANUAL THERAPY 1/> REGIONS: CPT

## 2018-02-21 PROCEDURE — 97110 THERAPEUTIC EXERCISES: CPT

## 2018-02-22 ENCOUNTER — APPOINTMENT (OUTPATIENT)
Dept: PHYSICAL THERAPY | Facility: CLINIC | Age: 58
End: 2018-02-22
Payer: COMMERCIAL

## 2018-02-26 ENCOUNTER — HOSPITAL ENCOUNTER (OUTPATIENT)
Dept: PHYSICAL THERAPY | Facility: CLINIC | Age: 58
Setting detail: THERAPIES SERIES
Discharge: HOME | End: 2018-02-26
Payer: COMMERCIAL

## 2018-02-28 ENCOUNTER — HOSPITAL ENCOUNTER (OUTPATIENT)
Dept: PHYSICAL THERAPY | Facility: CLINIC | Age: 58
Setting detail: THERAPIES SERIES
Discharge: HOME | End: 2018-02-28
Payer: COMMERCIAL

## 2018-02-28 PROCEDURE — 97140 MANUAL THERAPY 1/> REGIONS: CPT

## 2018-02-28 NOTE — FLOWSHEET NOTE
SCM         Specific Instructions for next treatment: CST/MFR, SOR  Treatment Charges: Mins Units   [] Modalities/ES VMS       [] Ther Exercise       [x] Manual Therapy 60 4   [] Ther Activities         [] Aquatics         [] Vasocompression         [] NMR       Total Treatment time 60 4           Assessment: Decreased tightness L side of face. Jaw opening to 4 mm post treatment.  Pt reported improved feeling of his face post treatment     STG: (to be met in 10 treatments)     1. Increase mandibular opening to 35-50%  2. Increase soft tissue tightness any  3. Improve  ROM R shoulder     LTG: (to be met in 18+ treatments)     1. Increase jaw opening to 75% or more  2. Maximize cervical, hyoid and facial mobility to allow for improved swallowing, chewing, having teeth implants  3. Maximize shoulder mobility.     Patient goals:Improve facial mobility to help with smiling, swallowing and prepping for getting teeth, chewing      Pt. Education: [x] Yes [] No [x] Reviewed facial and shouder ex  Method of Education: [x] Verbal [x] Demo [] Written  Comprehension of Education:  [x] Verbalizes understanding. [x] Demonstrates understanding. [] Needs review. [x] Demonstrates/verbalizes HEP/Ed previously given.       Plan: [x] Continue per plan of care.    [] Other:      Time In: 1300                      Time Out:1400  Electronically signed by: Loki Richards PT

## 2018-03-05 ENCOUNTER — HOSPITAL ENCOUNTER (OUTPATIENT)
Dept: PHYSICAL THERAPY | Facility: CLINIC | Age: 58
Setting detail: THERAPIES SERIES
Discharge: HOME | End: 2018-03-05
Payer: COMMERCIAL

## 2018-03-05 PROCEDURE — 97110 THERAPEUTIC EXERCISES: CPT

## 2018-03-05 PROCEDURE — 97140 MANUAL THERAPY 1/> REGIONS: CPT

## 2018-03-14 ENCOUNTER — HOSPITAL ENCOUNTER (OUTPATIENT)
Dept: PHYSICAL THERAPY | Facility: CLINIC | Age: 58
Setting detail: THERAPIES SERIES
Discharge: HOME | End: 2018-03-14
Payer: COMMERCIAL

## 2018-03-14 PROCEDURE — 97110 THERAPEUTIC EXERCISES: CPT

## 2018-03-14 PROCEDURE — 97140 MANUAL THERAPY 1/> REGIONS: CPT

## 2018-03-15 ENCOUNTER — HOSPITAL ENCOUNTER (OUTPATIENT)
Dept: PHYSICAL THERAPY | Facility: CLINIC | Age: 58
Setting detail: THERAPIES SERIES
Discharge: HOME | End: 2018-03-15
Payer: COMMERCIAL

## 2018-03-15 PROCEDURE — 97140 MANUAL THERAPY 1/> REGIONS: CPT

## 2018-03-15 NOTE — FLOWSHEET NOTE
[] Modalities/ES VMS       [] Ther Exercise       [x] Manual Therapy 60 4   [] Ther Activities         [] Aquatics         [] Vasocompression         [] NMR       Total Treatment time 60 4           Assessment: Decreased tightness L side of face. Jaw opening to 4 mm post treatment.  Pt reported improved feeling of his face post treatment     STG: (to be met in 10 treatments)     1. Increase mandibular opening to 35-50%  2. Increase soft tissue tightness any  3. Improve  ROM R shoulder     LTG: (to be met in 18+ treatments)     1. Increase jaw opening to 75% or more  2. Maximize cervical, hyoid and facial mobility to allow for improved swallowing, chewing, having teeth implants  3. Maximize shoulder mobility.     Patient goals:Improve facial mobility to help with smiling, swallowing and prepping for getting teeth, chewing      Pt. Education: [x] Yes [] No [x] Reviewed facial and shouder ex  Method of Education: [x] Verbal [x] Demo [] Written  Comprehension of Education:  [x] Verbalizes understanding. [x] Demonstrates understanding. [] Needs review. [x] Demonstrates/verbalizes HEP/Ed previously given.       Plan: [x] Continue per plan of care.    [] Other:      Time In: 1400                     Time Out:1500  Electronically signed by: Justo Watkins PT

## 2018-03-21 ENCOUNTER — APPOINTMENT (OUTPATIENT)
Dept: PHYSICAL THERAPY | Facility: CLINIC | Age: 58
End: 2018-03-21
Payer: COMMERCIAL

## 2018-03-21 ENCOUNTER — HOSPITAL ENCOUNTER (OUTPATIENT)
Dept: PHYSICAL THERAPY | Facility: CLINIC | Age: 58
Setting detail: THERAPIES SERIES
Discharge: HOME | End: 2018-03-21
Payer: COMMERCIAL

## 2018-03-21 PROCEDURE — 97140 MANUAL THERAPY 1/> REGIONS: CPT

## 2018-03-26 ENCOUNTER — APPOINTMENT (OUTPATIENT)
Dept: PHYSICAL THERAPY | Facility: CLINIC | Age: 58
End: 2018-03-26
Payer: COMMERCIAL

## 2018-04-04 ENCOUNTER — HOSPITAL ENCOUNTER (OUTPATIENT)
Dept: PHYSICAL THERAPY | Facility: CLINIC | Age: 58
Setting detail: THERAPIES SERIES
Discharge: HOME | End: 2018-04-04
Payer: COMMERCIAL

## 2018-04-04 PROCEDURE — 97110 THERAPEUTIC EXERCISES: CPT

## 2018-04-11 ENCOUNTER — HOSPITAL ENCOUNTER (OUTPATIENT)
Dept: PHYSICAL THERAPY | Facility: CLINIC | Age: 58
Setting detail: THERAPIES SERIES
Discharge: HOME | End: 2018-04-11
Payer: COMMERCIAL

## 2018-04-11 PROCEDURE — 97110 THERAPEUTIC EXERCISES: CPT

## 2018-05-01 ENCOUNTER — HOSPITAL ENCOUNTER (OUTPATIENT)
Dept: PHYSICAL THERAPY | Facility: CLINIC | Age: 58
Setting detail: THERAPIES SERIES
Discharge: HOME | End: 2018-05-01
Payer: COMMERCIAL

## 2018-05-01 PROCEDURE — 97110 THERAPEUTIC EXERCISES: CPT

## 2018-05-01 PROCEDURE — 97750 PHYSICAL PERFORMANCE TEST: CPT

## 2018-05-01 PROCEDURE — 97140 MANUAL THERAPY 1/> REGIONS: CPT

## 2018-05-07 ENCOUNTER — HOSPITAL ENCOUNTER (OUTPATIENT)
Dept: PHYSICAL THERAPY | Facility: CLINIC | Age: 58
Setting detail: THERAPIES SERIES
Discharge: HOME | End: 2018-05-07
Payer: COMMERCIAL

## 2018-05-07 PROCEDURE — 97140 MANUAL THERAPY 1/> REGIONS: CPT

## 2018-05-07 PROCEDURE — 97112 NEUROMUSCULAR REEDUCATION: CPT

## 2018-05-14 ENCOUNTER — HOSPITAL ENCOUNTER (OUTPATIENT)
Dept: PHYSICAL THERAPY | Facility: CLINIC | Age: 58
Setting detail: THERAPIES SERIES
Discharge: HOME | End: 2018-05-14
Payer: COMMERCIAL

## 2018-05-21 ENCOUNTER — HOSPITAL ENCOUNTER (OUTPATIENT)
Dept: PHYSICAL THERAPY | Facility: CLINIC | Age: 58
Setting detail: THERAPIES SERIES
Discharge: HOME | End: 2018-05-21
Payer: COMMERCIAL

## 2018-05-21 PROCEDURE — 97140 MANUAL THERAPY 1/> REGIONS: CPT

## 2018-06-04 ENCOUNTER — HOSPITAL ENCOUNTER (OUTPATIENT)
Dept: PHYSICAL THERAPY | Facility: CLINIC | Age: 58
Setting detail: THERAPIES SERIES
Discharge: HOME | End: 2018-06-04
Payer: COMMERCIAL

## 2018-06-04 PROCEDURE — 97140 MANUAL THERAPY 1/> REGIONS: CPT

## 2018-06-04 PROCEDURE — 97110 THERAPEUTIC EXERCISES: CPT

## 2018-08-06 ENCOUNTER — HOSPITAL ENCOUNTER (OUTPATIENT)
Dept: PHYSICAL THERAPY | Facility: CLINIC | Age: 58
Setting detail: THERAPIES SERIES
Discharge: HOME | End: 2018-08-06
Payer: COMMERCIAL

## 2018-08-06 PROCEDURE — 97110 THERAPEUTIC EXERCISES: CPT

## 2018-08-06 PROCEDURE — 97164 PT RE-EVAL EST PLAN CARE: CPT

## 2018-08-07 NOTE — CONSULTS
Hip Flex  wnl  5 Post. Drawer   []   Ext  stiff  4 Lachmans   []   ER  stiff  4+ Valgus Stress   []   IR  stiff  5 Varus Stress   []   ABD  wfl  4+ Anguss   []   ADD  wfl  5 Pat-Fem Grind   []   Knee Flex  wnl  5/4- Iline Joshua   []   Ext  wnl  5/5 Hip Scouring   []   Ankle DF     AMERICAs   []   PF     Piriformis   []   INV     Raghus   []   EVER     Alisia Chencho     []   GTE        []       OBSERVATION No Deficit Deficit Not Tested Comments   Posture       Forward Head [] [] []    Rounded Shoulders [] [] []    Kyphosis [] [] []    Lordosis [] [] []    Scoliosis [] [] []    Iliac Crest [] [] []    PSIS [] [] []    ASIS [] [] []    Genu Valgus [x] [] []    Genu Varus [x] [] []    Genu Recurvatum [x] [] []    Pronation [x] [] []    Supination [x] [] []    Leg Length Discrp [] [x] [] R was short   Slumped Sitting [] [] []    Palpation [x] [] []    Sensation [] [] []    Edema [x] [] []    Neurological [] [] []    Patellar Mobility [] [] []    Patellar Orientation [] [] []    Gait [] [] [] Analysis:     Run Analysis   Other: he continues to run on his toes, decreased knee flexion during swing, holds shoulders in an extended position. Comments:  Assessment: client reports that his running form feels unnatural and uncontrolled. Initially he presented with a short R leg and decreased R hip IR, but these normalized after R long leg traction and mobilization. He also presented with weakness of hamstring at near full extension. For this, he was given a HEP for hamstring strengthening, both concentrically and eccentrically. However, even when he was unloaded to 60% in the Alter G, he continued to report subjective report of uncoordinated R LE movement. Problems:      [x] ? ROM:generally with susy hips    [x] ? Strength: With hip ext, ext rotation, and abd. [x] ?  Function: Not able to run as he wishes   [x] Gait Deviations  [] Other:      STG: (to be met in 5 treatments)  1. ? ROM: improve hamstring and calf flexibility. 2. ? Strength: to 4/5 with all hip movements  3. ? Function: to to run with only minimal feeling of uncoordination  4. Independent with Home Exercise Programs    LTG: (to be met in 10 treatments)  1. Able to run without any feeling of LE uncoordination. 2.                  Patient goals: to run without feeling of uncoordination. Rehab Potential:  [x] Good  [] Fair  [] Poor   Suggested Professional Referral:  [x] No  [] Yes:  Barriers to Goal Achievement[de-identified]  [x] No  [] Yes:  Domestic Concerns:  [x] No  [] Yes:    Pt. Education:  [x] Plans/Goals, Risks/Benefits discussed  [x] Home exercise program    Method of Education: [] Verbal  [] Demo  [x] Written  Comprehension of Education:  [] Verbalizes understanding. [] Demonstrates understanding. [x] Needs Review. [] Demonstrates/verbalizes understanding of HEP/Ed previously given. Treatment Plan:  [x] Therapeutic Exercise    [] Therapeutic Activity  [x] Manual Therapy   [x] Alter G treadmill  [x] Phys perf test     [] Vasocompression/Game Ready   [x] Neuromuscular Re-education [] Massage     [x] Instruction in HEP     [] Aquatic Therapy                           Frequency:  1x/week for 10 visits    Todays Treatment:  Modalities: none  Precautions: standard  Exercises:  Exercise Reps/ Time Weight/ Level Issued for HEP  Comments   Prone         Hamstring curls next  8/6     Supine        Hamstring walkouts 10  8/6     Hamstring curls w/ PB 10  8/6     Gym        Nordic hamstring curls 1 set to fatigue  8/6     Hamstring hamstring scoots 1/2 lap  8/6                                                             Other:    Specific Instructions for next treatment: he is to follow up with PCP for further assessment.      Treatment Charges: Mins Units   [x] Re evaluation  1   [] Phys perf test     [x]  Ther Exercise 30 2   []  Manual Therapy     []  Ther Activities     []  Aquatics     []  Vasocompression     []  NMR       TOTAL TREATMENT TIME: 60    Time in:1300   Time Out:1400    Electronically signed by: Анна Londono PT    As we have a standing verbal order from Dr. Paulina Robert, once signed, this eval/POC will serve as the formal written order. Physician Signature:________________________________Date:__________________  By signing above or cosigning this note, I have reviewed this plan of care and certify a need for medically necessary rehabilitation services.      *PLEASE SIGN ABOVE AND FAX BACK ALL PAGES*

## 2019-06-13 ENCOUNTER — HOSPITAL ENCOUNTER (OUTPATIENT)
Dept: PHYSICAL THERAPY | Facility: CLINIC | Age: 59
Setting detail: THERAPIES SERIES
Discharge: HOME | End: 2019-06-13
Payer: COMMERCIAL

## 2019-06-13 PROCEDURE — 97110 THERAPEUTIC EXERCISES: CPT

## 2019-06-13 PROCEDURE — 97161 PT EVAL LOW COMPLEX 20 MIN: CPT

## 2019-06-13 NOTE — CONSULTS
Valgus Stress   []   IR     Varus Stress   []   ABD stiff stiff 4 4 Anguss   []   ADD     Pat-Fem Grind   []   Knee Flex     FADIRs   []   Ext     Hip Scouring   []   Ankle DF stiff stiff   AMERICAs   []   PF     Piriformis   []   INV     Raghus   []   EVER     Jordan     []   GTE        []       OBSERVATION No Deficit Deficit Not Tested Comments   Posture       Forward Head [] [x] []    Rounded Shoulders [] [x] []    Kyphosis [] [] []    Lordosis [] [] []    Scoliosis [] [] []    Iliac Crest [] [] []    PSIS [] [] []    ASIS [] [] []    Genu Valgus [] [] []    Genu Varus [] [] []    Genu Recurvatum [] [] []    Pronation [] [] []    Supination [] [] []    Leg Length Discrp [x] [] []    Slumped Sitting [] [] []    Palpation [] [] []    Sensation [] [] []    Edema [] [] []    Neurological [] [] []    Patellar Mobility [] [] []    Patellar Orientation [] [] []    Gait [] [] [] Analysis:         Comments:  Assessment: pt presents with difficulty running, slowing down from running, decreased stride length, and difficulty coordinating running form. He states that he can't run more than a couple of minutes without stopping. He also notes increased difficulty with single leg stance. Problems:    [] ? Pain:     [x] ? ROM: With hip ext    [x] ? Strength: With glut max   [x] ? Function: Not able to run as he wishes   [x] ? Balance  [] Increased edema:  [x] Postural Deviations forward head, sustained shoulder ext at all times when running, forefoot strike pattern susy  [] Gait Deviations  [] Other:      STG: (to be met in 5 treatments)  1. ? ROM: full hip ext  2. ? Strength: 5/5 with hip ext  3. ? Function: able to run up to 20 min consecutive without stopping  4. Independent with Home Exercise Programs    LTG: (to be met in 10 treatments)  1. Able to run 60 min consecutive without stopping.                   Patient FYCBB:\"VTGVBFS to run\"    Rehab Potential:  [x] Good  [] Fair  [] Poor   Suggested Professional Referral: or cosigning this note, I have reviewed this plan of care and certify a need for medically necessary rehabilitation services.      *PLEASE SIGN ABOVE AND FAX BACK ALL PAGES*

## 2019-06-20 ENCOUNTER — HOSPITAL ENCOUNTER (OUTPATIENT)
Dept: PHYSICAL THERAPY | Age: 59
Setting detail: THERAPIES SERIES
Discharge: HOME | End: 2019-06-20
Payer: COMMERCIAL

## 2019-06-20 PROCEDURE — 97162 PT EVAL MOD COMPLEX 30 MIN: CPT

## 2019-06-20 PROCEDURE — 97530 THERAPEUTIC ACTIVITIES: CPT

## 2019-06-20 PROCEDURE — 97110 THERAPEUTIC EXERCISES: CPT

## 2019-06-27 ENCOUNTER — HOSPITAL ENCOUNTER (OUTPATIENT)
Dept: PHYSICAL THERAPY | Age: 59
Setting detail: THERAPIES SERIES
Discharge: HOME | End: 2019-06-27
Payer: COMMERCIAL

## 2019-06-27 PROCEDURE — 97530 THERAPEUTIC ACTIVITIES: CPT

## 2019-06-27 PROCEDURE — 97112 NEUROMUSCULAR REEDUCATION: CPT

## 2019-06-27 PROCEDURE — 97110 THERAPEUTIC EXERCISES: CPT
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
## 2019-07-02 ENCOUNTER — HOSPITAL ENCOUNTER (OUTPATIENT)
Dept: PHYSICAL THERAPY | Age: 59
Setting detail: THERAPIES SERIES
Discharge: HOME | End: 2019-07-02
Payer: COMMERCIAL

## 2019-07-02 PROCEDURE — G0283 ELEC STIM OTHER THAN WOUND: HCPCS

## 2019-07-02 PROCEDURE — 97112 NEUROMUSCULAR REEDUCATION: CPT

## 2019-07-02 PROCEDURE — 97110 THERAPEUTIC EXERCISES: CPT

## 2019-07-02 NOTE — PROGRESS NOTES
TBD  []  6.  Increased Dizziness Handicap Inventory ([address])   [x]  7.  Increased Post Concussion Symptom SCALE (PCSS)  [x]  8.  (+) GAZE HOLDING NYSTAGMUS INDICATING VESTIBULAR HYPOFUNCTION, SYMPTOM PROVOCATION WITH OCULOMOTOR ACTIVITIES, (+) ANXIETY         Short Term Goals: ( 4-12  Treatments)  [] 1. Decreased Vertigo  [x] 2. Improved gait mechanics, trajectory: PATIENT WILL BE ABLE TO RUN ON A TREADMILL W/O VEERING  [x] 3. PATIENT WILL REPORT 75% IMPROVEMENT IN SYMPTOMS DURING A 5 MILE RUN  [] 4. Improved static balance  [x] 5. Improved dynamic balance: INCREASE FGA BY 3  [] 6. Decreased Dizziness Handicap Inventory [facility])  [x] 7. Decreased Post Concussion Symptom Scale (PCSS) (17/144) BY 10  [x] 8. Improve Vestibular Ocular Motor Screen (VOMS) (14) BY 8  [x] 9. Independent with Home Exercise Program (HEP)  [x] 10. Demonstrate knowledge of fall prevention  X  11.  2 OR LESS LINE DIFFERENCE IN STATIC VS DYNAMIC VISUAL ACUITY     Long Term Goals: ( TBD   Treatments)  [] 1.  [] 2.      Patient Goals: RETURN TO RUNNING, IMPROVE COORDINATION WHEN RUNNING, IMPROVE BALANCE     Pt. Education:  [x] Yes  [] No  [x] Reviewed Prior HEP/Ed/PROGRESSED  Method of Education: [x] Verbal  [x] Demo  [x] Written  Comprehension of Education:  [x] Verbalizes understanding. [x] Demonstrates understanding. [] Needs review. [] Demonstrates/verbalizes HEP/Ed previously given.      Plan:    [x] Continue per plan of care. [] Other:                            Time In: 825            Time Out: 925     Electronically signed by:   Annita Lozano, PT

## 2019-07-09 ENCOUNTER — HOSPITAL ENCOUNTER (OUTPATIENT)
Dept: PHYSICAL THERAPY | Age: 59
Setting detail: THERAPIES SERIES
Discharge: HOME | End: 2019-07-09
Payer: COMMERCIAL

## 2019-07-09 PROCEDURE — G0283 ELEC STIM OTHER THAN WOUND: HCPCS

## 2019-07-09 PROCEDURE — 97110 THERAPEUTIC EXERCISES: CPT

## 2019-07-09 PROCEDURE — 97112 NEUROMUSCULAR REEDUCATION: CPT

## 2019-07-11 ENCOUNTER — HOSPITAL ENCOUNTER (OUTPATIENT)
Dept: PHYSICAL THERAPY | Age: 59
Setting detail: THERAPIES SERIES
Discharge: HOME | End: 2019-07-11
Payer: COMMERCIAL

## 2019-07-11 PROCEDURE — G0283 ELEC STIM OTHER THAN WOUND: HCPCS

## 2019-07-11 PROCEDURE — 97110 THERAPEUTIC EXERCISES: CPT

## 2019-07-11 PROCEDURE — 97112 NEUROMUSCULAR REEDUCATION: CPT

## 2019-07-11 NOTE — PROGRESS NOTES
AMAP HEP             ALTER G:     TOTAL OF 21'   GAIT PATTERN: SYMMETRICAL STANCE AND STRIDE LENGTH, TRY HEEL STRIKE, IMPROVING FWD FLEXED POSTURE, HIGH KNEES 3' 5.0 mph     PRACTICING INCREASING LEFT STANCE/STRIDE TO GREATER THAN RIGHT 2' X3 2.4 MPH  GOT TO 54% ON LEFT AND LEFT/RIGHT STRIDE 25/21   WALKING WITH HEAD MOTION EO (4 DIRECTIONS) 60\" EA  4.6 mph     VOR CANC (2 DIRECTIONS) 60\" EA 4.6 mph     GAZE STAB X1 (2 DIRECTIONS) 60\" EA 4.6 mph TARGET ON SCREEN   AMB EC     ADD   CONVERGENCE     ADD             GYM:         WALKING EC 4 WAY HEAD MOTION  40\" X2 LAPS EA   TRIED SOME RETRO   GRID LUNGES: EO/TO SLS/EC  2/1/2       4 WAY HEAD MOTION EC CHALLENGED BALANCE     ADD   PUDDLE JUMP LATERAL WITH SLS CONE PLACEMENT 4 CONES 3X     JUMP ROPE TRIAL      BOSU LATERAL STEP ACROSS, 2\" BLINK, 4 WAY HEAD MOTION 10 EA  OUT OF BARS SO LESS CORRECTION WITH UE'S, ALSO SLOW STEP R TO INCREASE STANCE ON L    ROCKERBOARD: FWD/BACK AND S/S TAPPING AND BALANCING  30-40\" EA  LE  EO AND EC    T BAND DF 10X2, 2\" HOLD BLUE TRIED MANUAL RESISTANCE DURING STIM TODAY    ESTIM MUSCLE EDU     AS BELOW    WEDGE STRETCH 1'X2       Modalities:  ESTIM FOR MUSCLE RE EDU TO ANTERIOR TIBIALIS: ON/OFF 10\"/20\", RAMP 2\" FOR A TOTAL OF 15'.  PATIENT WAS ENCOURAGED TO ACTIVELY DORSI FLEX DURING THE STIM. (MANUALLY RESISTED SOME REPS)  Other:   REST BREAKS OF WALKING ON TREADMILL BETWEEN ACTIVITIES DUE TO SIGNS OF FATIGUE OF SOB (BUT LESS TODAY) AND INCREASING DIFFICULTY WITH EXERCISE. LEFT LEG LOOKS SLUGGISH AND LESS COORDINATED THAN RIGHT. ATAXIA WITH MORE COMPLICATED ACTIVITIES OBSERVED.     PATIENT ADDING ATLER G  ON HIS OWN W/F     Specific Instructions for next treatment:  PROGRESS AS OUTLINED ABOVE.   CONSIDER LADDER      Treatment Charges: Mins Units   [x]  Modalities 15  1   [x]  Ther Exercise 15 1   []  Manual Therapy       []  Ther Activities       []  Aquatics       [x]  Neuromuscular 45 3   []  Other       Total Treatment time 75 5

## 2019-07-16 ENCOUNTER — HOSPITAL ENCOUNTER (OUTPATIENT)
Dept: PHYSICAL THERAPY | Age: 59
Setting detail: THERAPIES SERIES
Discharge: HOME | End: 2019-07-16
Payer: COMMERCIAL

## 2019-07-16 PROCEDURE — G0283 ELEC STIM OTHER THAN WOUND: HCPCS

## 2019-07-16 PROCEDURE — 97112 NEUROMUSCULAR REEDUCATION: CPT

## 2019-07-16 PROCEDURE — 97110 THERAPEUTIC EXERCISES: CPT

## 2019-07-16 NOTE — PROGRESS NOTES
ABD R 4+/5 , HIP ADD B 5/5 L 4++/5, HIP IR B 5/5, HIP ER R 5/5, L 4+/5, ANKLE DF R 5/5, L 4++/5, PF B 5/5. DISCUSSED WITH PATIENT, WITH FATIGUE THERE MAYBE MORE OF DIFFERENCE IN LEG STRENGTH.          Exercises:  Exercise Reps/ Time Weight/ Level Comments COMPLETED   GAZE STAB X1 (2 DIRECTIONS) FWD FAST WALKING 60\"   2-3X/DAY HEP  PROGRESSED 7/2       VOR CANC (4 DIRECTIONS) FWD FAST WALKING 60\"   2-3X/DAY HEP  PROGRESSED 7/2    SLS L      AMAP HEP               ALTER G:     TOTAL OF 21'    GAIT PATTERN: SYMMETRICAL STANCE AND STRIDE LENGTH, TRY HEEL STRIKE, IMPROVING FWD FLEXED POSTURE, HIGH KNEES 3' 5.0 mph     X   PRACTICING INCREASING LEFT STANCE/STRIDE TO GREATER THAN RIGHT 2' X3 2.8 MPH  GOT TO 53% ON LEFT AND LEFT/RIGHT STRIDE 33/28   X   RUNNING WITH HEAD MOTION EO (4 DIRECTIONS) 30-60\" EA  4.6 mph     X   VOR CANC (2 DIRECTIONS) 60\" EA 4.6 mph     X   GAZE STAB X1 (2 DIRECTIONS) 60\" EA 4.6 mph TARGET ON SCREEN    AMB EC BOUTS  2'      X   CONVERGENCE  1'   ALSO FAR TO NEAR TARGET CONVERGENCE   X              GYM:          DYNAMIC WARM UP: SUPER DUNCAN, SLS LEG SWING NO UE SUPPORT, HIGH KNEES   ADDED TO HOME PROGRAM WARM UP PRIOR TO RUNNING 7/16   X   RUNNING WITH 4 WAY HEAD MOTION  80' LAP 1-2 EA   CONSIDER SOME RETRO   X   GRID LUNGES: EO BIG STEP/TO SLS/EC 1/1/1       X   4 WAY HEAD MOTION EC SEMI TANDEM STANCE 10 EA   ALSO ADDED TO HEP 7/16   X   PUDDLE JUMP LATERAL WITH SLS CONE PLACEMENT 4 CONES 3X       X   JUMP ROPE TRIAL          BOSU LATERAL STEP ACROSS AND FRONT LUNGES (TRIED SOME LUNGES EC) 10 EA   OUT OF BARS SO LESS CORRECTION WITH UE'S, ALSO SLOW STEP R TO INCREASE STANCE ON L   X    ROCKER BOARD: FWD/BACK AND S/S TAPPING AND BALANCING  30-40\" EA  LE  EO AND EC     T BAND DF 10X2, 2\" HOLD BLUE      ESTIM MUSCLE EDU     WITH MANUAL RESISTANCE DURING STIM  X    WEDGE STRETCH 1'X2    INDEP   X   Modalities:  ESTIM FOR MUSCLE RE EDU TO ANTERIOR TIBIALIS: ON/OFF 10\"/20\", RAMP 2\" FOR A TOTAL OF 15'.  PATIENT WAS ENCOURAGED TO ACTIVELY DORSI FLEX DURING THE STIM. (MANUALLY RESISTED MOST REPS TODAY)  Other: DISCUSSED WITH PATIENT TO TRY TO MAKE RUNNING MORE SUCCESSFUL BY NOT BEING OVERLY FATIGUED FROM WORKING OUT THAT DAY. DISCUSS REST IS IMPORTANT PART OF RECOVERY PROCESS. HE WAS ADVISED TO MODIFY DAILY ACTIVITIES AND EXERCISE TO INCREASE LEFT STANCE AND LEFT SLS BALANCE. HE WAS ADVISED TO WORK ON CHANGING ACTIVITIES IN ORDER TO USE THE LEFT INSTEAD OF  REPEATING PATTERN OF AVOIDING USE.    HE WAS ADVISED DURING INDEP USE OF ALTER G USE THE DATA TO INCREASE LEFT STANCE TIME DURING ACTIVITIES. ADVISED ON ALTER G TO NOT TAKE OFF SO MUCH BODY WT HE IS NOT GETTING THE BENEFIT OF THE WORKING ON IMPROVING STANCE TIME, STRIDE LENGTH AND IMPROVED MECHANICS. PROGRESS HEP AS ABOVE, HE WAS ISSUED WRITTEN INSTRUCTIONS.       Specific Instructions for next treatment:  PROGRESS AS OUTLINED ABOVE. CONSIDER LADDER      Treatment Charges: Mins Units   [x]  Modalities 15  1   [x]  Ther Exercise 15 1   []  Manual Therapy       []  Ther Activities       []  Aquatics       [x]  Neuromuscular 45 3   []  Other       Total Treatment time 75 5         Assessment:  [x] Progressing toward goals.                           [] No change.                          [] Other:     Problems:                                                                                     []  1.  Vertigo  [x]  2.  Difficulty walking a straight course                                    []  3.  Positive [place] Hallpike                                               []  4.  Static balance deficit: mCTSIB  [x]  5.  Dynamic balance deficit: Gallardo Balance Scale (BBS), Dynamic Gait Index (DGI), Functional Gait Assessment (FGA) TBD  []  6.  Increased Dizziness Handicap Inventory (DHI)   [x]  7.  Increased Post Concussion Symptom SCALE (PCSS)  [x]  8.  (+) GAZE HOLDING NYSTAGMUS INDICATING VESTIBULAR HYPOFUNCTION, SYMPTOM PROVOCATION WITH OCULOMOTOR ACTIVITIES, (+) ANXIETY         Short Term Goals: ( 4-12  Treatments)  [] 1. Decreased Vertigo  [x] 2. Improved gait mechanics, trajectory: PATIENT WILL BE ABLE TO RUN ON A TREADMILL W/O VEERING  [x] 3. PATIENT WILL REPORT 75% IMPROVEMENT IN SYMPTOMS DURING A 5 MILE RUN  [] 4. Improved static balance  [x] 5. Improved dynamic balance: INCREASE FGA BY 3  [] 6. Decreased Dizziness Handicap Inventory [facility])  [x] 7. Decreased Post Concussion Symptom Scale (PCSS) (17/144) BY 10  [x] 8. Improve Vestibular Ocular Motor Screen (VOMS) (14) BY 8  [x] 9. Independent with Home Exercise Program (HEP)  [x] 10. Demonstrate knowledge of fall prevention  X  11.  2 OR LESS LINE DIFFERENCE IN STATIC VS DYNAMIC VISUAL ACUITY     Long Term Goals: ( TBD   Treatments)  [] 1.  [] 2.      Patient Goals: RETURN TO RUNNING, IMPROVE COORDINATION WHEN RUNNING, IMPROVE BALANCE     Pt. Education:  [x] Yes  [] No  [x] Reviewed Prior HEP/Ed/PROGRESSED  Method of Education: [x] Verbal  [x] Demo  [x] Written  Comprehension of Education:  [x] Verbalizes understanding. [x] Demonstrates understanding. [] Needs review.   [] Demonstrates/verbalizes HEP/Ed previously given.      Plan:    [x] Continue per plan of care.              [] Other:     Time In: 825            Time Out: 940        Electronically signed by Kale Castaneda PT

## 2019-07-18 ENCOUNTER — HOSPITAL ENCOUNTER (OUTPATIENT)
Dept: PHYSICAL THERAPY | Age: 59
Setting detail: THERAPIES SERIES
Discharge: HOME | End: 2019-07-18
Payer: COMMERCIAL

## 2019-07-18 PROCEDURE — G0283 ELEC STIM OTHER THAN WOUND: HCPCS

## 2019-07-18 PROCEDURE — 97112 NEUROMUSCULAR REEDUCATION: CPT

## 2019-07-18 PROCEDURE — 97110 THERAPEUTIC EXERCISES: CPT
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
## 2019-07-23 ENCOUNTER — APPOINTMENT (OUTPATIENT)
Dept: PHYSICAL THERAPY | Age: 59
End: 2019-07-23
Payer: COMMERCIAL

## 2019-07-25 ENCOUNTER — HOSPITAL ENCOUNTER (OUTPATIENT)
Dept: PHYSICAL THERAPY | Age: 59
Setting detail: THERAPIES SERIES
Discharge: HOME | End: 2019-07-25
Payer: COMMERCIAL

## 2019-07-25 PROCEDURE — G0283 ELEC STIM OTHER THAN WOUND: HCPCS

## 2019-07-25 PROCEDURE — 97112 NEUROMUSCULAR REEDUCATION: CPT

## 2019-07-25 PROCEDURE — 97110 THERAPEUTIC EXERCISES: CPT

## 2019-07-25 NOTE — PROGRESS NOTES
scale) 0/10  Pain altered Tx:  [x] No  [] Yes  Action:  Comments:   Objective:  Precautions: NONE IDENTIFIED     PATIENT DOES TEST NORMAL FOR GASTROC STRENGTH BUT DURING RUNNING ON TREADMILL IT IS NOTED THAT HE HAS LESS GASTROC MUSCULATURE ON THE LEFT VS RIGHT.          Exercises:  Exercise Reps/ Time Weight/ Level Comments COMPLETED   GAZE STAB X1 (2 DIRECTIONS) FWD FAST WALKING 60\"   2-3X/DAY HEP  PROGRESSED 7/2      VOR CANC (4 DIRECTIONS) FWD FAST WALKING 60\"   2-3X/DAY HEP  PROGRESSED 7/2     SLS L      AMAP HEP                 ALTER G:           GAIT PATTERN: SYMMETRICAL STANCE AND STRIDE LENGTH, TRY HEEL STRIKE, IMPROVING FWD FLEXED POSTURE, HIGH KNEES 5' 5.0 mph        PRACTICING INCREASING LEFT STANCE/STRIDE TO GREATER THAN RIGHT 2' X3 2.8 MPH  GOT TO 53% ON LEFT AND LEFT/RIGHT STRIDE 33/28      WALKING WITH HEAD MOTION EO (4 DIRECTIONS) 30-60\" EA 2.8 mph  WALKING ON STANDARD TREADMILL TODAY   X   VOR CANC (2 DIRECTIONS) 60\" EA 2.8 mph  WALKING ON STANDARD TREADMILL TODAY   X   GAZE STAB X1 (2 DIRECTIONS) 60\" EA 3.0 mph DISTANT TARGET  WALKING ON STANDARD TREADMILL TODAY  X   AMB EC BOUTS  2'  2.0 MPH  WALKING ON STANDARD TREADMILL TODAY   X   CONVERGENCE  1'   ALSO FAR TO NEAR TARGET CONVERGENCE                 GYM:           DYNAMIC WARM UP: SUPER DUNCAN, BUT KICKERS, HIGH KNEES, BRAIDING     ADDED TO HOME PROGRAM WARM UP PRIOR TO RUNNING   X   RUNNING WITH 4 WAY HEAD MOTION  80' LAP 1-2 EA   CONSIDER SOME RETRO   X   GRID LUNGES: EO BIG STEP/TO SLS/EC 1/1/1       X   4 WAY HEAD MOTION EC SEMI TANDEM STANCE 10 EA   ALSO ADDED TO HEP 7/16     PUDDLE JUMP AND PUDDLE JUMP WITH  LATERAL WITH SLS CONE PLACEMENT 2 LAPS EA CONES       X   LINE JUMP S-S, F-B  30'X2 EA     X   S/L LINE JUMP LEFT  20  (LATERAL)    BOSU LATERAL STEP ACROSS, 3 SEC BLINK AND 4 WAY HEAD MOTION 10 EA   OUT OF BARS SO LESS CORRECTION WITH UE'S, ALSO SLOW STEP R TO INCREASE STANCE ON L   X    ROCKER BOARD: FWD/BACK WITH STAGGERED STANCE AND S/S TAPPING AND BALANCING  30-40\" EA  L3  EO AND EC ROCKING  X    T BAND DF 10X2, 2\" HOLD BLUE        ESTIM MUSCLE EDU FOR ANTERIOR TIBALIS     WITH MANUAL RESISTANCE DURING STIM  X   S/L HEEL RAISE ON TOTAL GYM (KNEE EXT AND FLEX) 20X2 EA    X    WEDGE STRETCH 1'X2    INDEP   X   Modalities:  ESTIM FOR MUSCLE RE EDU TO ANTERIOR TIBIALIS: ON/OFF 10\"/20\", RAMP 2\" FOR A TOTAL OF 15'.  PATIENT WAS ENCOURAGED TO ACTIVELY DORSI FLEX DURING THE STIM. (MANUALLY RESISTED A FEW REPS TODAY)  Other: CUES OF LIGHT LANDING WITH JUMPING ACTIVITY       Specific Instructions for next treatment:  PROGRESS AS OUTLINED ABOVE.  CONSIDER S-S SHUTTLE RUN, ALTERNATING QUICK LUNGES  NEEDS RE-EVAL NEXT WEEK. DISCUSSED DECREASING TO 1X/WK.     Treatment Charges: Mins Units   [x]  Modalities 15  1   [x]  Ther Exercise 15 1   []  Manual Therapy       []  Ther Activities       []  Aquatics       [x]  Neuromuscular 30 2   []  Other       Total Treatment time 60 4         Assessment:  [x] Progressing toward goals.                         [] No change.                          [] Other:     Problems:                                                                                     []  1.  Vertigo  [x]  2.  Difficulty walking a straight course                                    []  3.  Positive [place] Hallpike                                               []  4.  Static balance deficit: mCTSIB  [x]  5.  Dynamic balance deficit: Gallardo Balance Scale (BBS), Dynamic Gait Index (DGI), Functional Gait Assessment (FGA) TBD  []  6.  Increased Dizziness Handicap Inventory (DHI)   [x]  7.  Increased Post Concussion Symptom SCALE (PCSS)  [x]  8.  (+) GAZE HOLDING NYSTAGMUS INDICATING VESTIBULAR HYPOFUNCTION, SYMPTOM PROVOCATION WITH OCULOMOTOR ACTIVITIES, (+) ANXIETY         Short Term Goals: ( 4-12  Treatments)  [] 1. Decreased Vertigo  [x] 2. Improved gait mechanics, trajectory: PATIENT WILL BE ABLE TO RUN ON A TREADMILL W/O VEERING  [x] 3.  PATIENT WILL REPORT 75% IMPROVEMENT IN SYMPTOMS DURING A 5 MILE RUN  [] 4. Improved static balance  [x] 5. Improved dynamic balance: INCREASE FGA BY 3  [] 6. Decreased Dizziness Handicap Inventory [facility])  [x] 7. Decreased Post Concussion Symptom Scale (PCSS) (17/144) BY 10  [x] 8. Improve Vestibular Ocular Motor Screen (VOMS) (14) BY 8  [x] 9. Independent with Home Exercise Program (HEP)  [x] 10. Demonstrate knowledge of fall prevention  X  11.  2 OR LESS LINE DIFFERENCE IN STATIC VS DYNAMIC VISUAL ACUITY     Long Term Goals: ( TBD   Treatments)  [] 1.  [] 2.      Patient Goals: RETURN TO RUNNING, IMPROVE COORDINATION WHEN RUNNING, IMPROVE BALANCE     Pt. Education:  [x] Yes  [] No  [x] Reviewed Prior HEP/Ed/PROGRESSED  Method of Education: [x] Verbal  [x] Demo  [x] Written  Comprehension of Education:  [x] Verbalizes understanding. [x] Demonstrates understanding. [] Needs review.   [] Demonstrates/verbalizes HEP/Ed previously given.      Plan:    [x] Continue per plan of care.              [] Other:     Time In: 820            Time RJW: 529  Electronically signed by Seb Duncan PT on 7/25/2019 at 8:20 AM

## 2019-07-30 ENCOUNTER — HOSPITAL ENCOUNTER (OUTPATIENT)
Dept: PHYSICAL THERAPY | Age: 59
Setting detail: THERAPIES SERIES
Discharge: HOME | End: 2019-07-30
Payer: COMMERCIAL

## 2019-07-30 PROCEDURE — 97110 THERAPEUTIC EXERCISES: CPT

## 2019-07-30 PROCEDURE — G0283 ELEC STIM OTHER THAN WOUND: HCPCS

## 2019-07-30 PROCEDURE — 97112 NEUROMUSCULAR REEDUCATION: CPT

## 2019-07-30 NOTE — PROGRESS NOTES
Physical Therapy               [] [facility]  Outpatient Rehabilitation &  Therapy  [address]  [phone]  F: [phone] [] [facility] [place]  Outpatient Rehabilitation &  Therapy  [address]   [address]  P: [phone]  F: [phone] [x] [facility] &  Therapy  [address] [facility] [address]  P: [phone]  F: [phone] [] [address]  [address]  [address]  P: [address]oors  F: Kalen Thompson [address] &  Therapy  [place]: [phone]  F: [phone]               Physical Therapy Daily Treatment Note        Date:  2019  Patient Name:  Ramila Jaime               :  1960                     MRN: [id number]  Physician: KAMERON TAYLOR (SPORTS MEDICINE), Ricky Ahmadi (PCP)                   Insurance: [address] [place] [address] Drive  Onset date: 2018             Next Dr's appt.: PRN  Visit# / total visits:                     Cancels/No Shows: 0/0     Subjective:  PATIENT REPORTS HE WAS ABLE TO RUN FOR MINUTES AT A TIME RELAXED AND NOT THINKING ABOUT EACH STEP. HE NOTES IMPROVEMENT IN LEFT LE STRENGTH. [employer] SYMMETRICAL LEFT VS RIGHT IN ADL'S/EXRCISE PROGRAM.     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   HE STATE HE WAS ABLE TO CONCENTRATE ON THIS AND IMPROVE BUT AS SOON AS HE WAS NOT FOCUSED HE RETURNED TO MOSTLY R CYCLING.        CONTINUES TO WORK ON [employer] G ON DAYS HE DOES NOT HAVE THERAPY.        Pain:  [] Yes  [x] No          Location:  N/A Pain Rating: (0-10 scale) 0/10  Pain altered Tx:  [x] No  [] Yes  Action:  Objective:  Precautions: NONE IDENTIFIED        Exercises:  Exercise Reps/ Time Weight/ Level Comments COMPLETED   GAZE STAB X1 (2 DIRECTIONS) FWD FAST WALKING 60\"   2-3X/DAY HEP  PROGRESSED 7/2      VOR CANC (4 DIRECTIONS) FWD FAST WALKING 60\"   2-3X/DAY HEP  PROGRESSED 7/2     SLS L      AMAP HEP                 ALTER G:           GAIT PATTERN: SYMMETRICAL STANCE AND STRIDE LENGTH, TRY HEEL STRIKE, IMPROVING FWD FLEXED POSTURE, HIGH KNEES 5' 5.0 mph        PRACTICING INCREASING LEFT STANCE/STRIDE TO GREATER THAN RIGHT 2' X3 2.8 MPH  GOT TO 53% ON LEFT AND LEFT/RIGHT STRIDE 33/28      WALKING WITH HEAD MOTION EO (4 DIRECTIONS) 30-60\" EA 2.8 mph  WALKING ON STANDARD TREADMILL TODAY      VOR CANC (2 DIRECTIONS) 60\" EA 2.8 mph  WALKING ON STANDARD TREADMILL TODAY      GAZE STAB X1 (2 DIRECTIONS) 60\" EA 3.0 mph DISTANT TARGET  WALKING ON STANDARD TREADMILL TODAY     AMB EC BOUTS  2'  2.0 MPH  WALKING ON STANDARD TREADMILL TODAY      CONVERGENCE  1'   ALSO FAR TO NEAR TARGET CONVERGENCE                 GYM:           DYNAMIC WARM UP: SUPER DUNCAN, BUTT KICKERS, HIGH KNEES, BRAIDING     ADDED TO HOME PROGRAM WARM UP PRIOR TO RUNNING   X   RUNNING RETRO 40' X2   X   RUNNING VOR CANC (2 DIREICTIONS 80' LAP 2 EA   X   RUNNING WITH 4 WAY HEAD MOTION  80' LAP 2 EA   CONSIDER SOME RETRO   X   GRID LUNGES: TO SLS 2EA    CROSS FRONT AND BACK   X   4 WAY HEAD MOTION EC SEMI TANDEM STANCE 10 EA   ALSO ADDED TO HEP 7/16     PUDDLE JUMPS; PUDDLE JUMP WITH  LATERAL WITH SLS CONE PLACEMENT;  S/L PUDDLE JUMPT 2 LAPS EA, 5 CONES       X   LINE JUMP S-S, F-B  30'X2 EA      X   S/L LINE JUMP LEFT  20   (LATERAL)     JUMPING DOWN WITH EXAGGERATED LANDING 10X 6\" BOX  X   FROG JUMP (4-5)WITH EXAGGERATED LANDING 4 X   X   QUICK ALTERNATING LUNGES WITH  EXAGGERATED LANDING 10X2   X   BOSU LATERAL STEP ACROSS, 3 SEC BLINK AND 4 WAY HEAD MOTION 10 EA   OUT OF BARS SO LESS CORRECTION WITH UE'S, ALSO SLOW STEP R TO INCREASE STANCE ON L   X    ROCKER BOARD: FWD/BACK WITH STAGGERED STANCE AND S/S TAPPING AND BALANCING  30-40\" EA  L3  EO AND EC ROCKING  X    ESTIM MUSCLE EDU FOR ANTERIOR TIBALIS     WITH MANUAL RESISTANCE DURING STIM  X   S/L HEEL RAISE ON TOTAL GYM (KNEE EXT AND FLEX) 20X2 EA     X    WEDGE STRETCH 1'X2    INDEP   X   Modalities:  ESTIM FOR MUSCLE RE EDU TO ANTERIOR TIBIALIS: ON/OFF 10\"/20\", RAMP 2\" FOR A TOTAL OF 15'.  PATIENT WAS ENCOURAGED TO ACTIVELY DORSI FLEX DURING THE STIM. (MANUALLY ABOUT 1/2 REPS TODAY)  Other: CUES OF LIGHT LANDING WITH JUMPING ACTIVITY        Specific Instructions for next treatment:  PROGRESS AS OUTLINED ABOVE.  CONSIDER S-S SHUTTLE RUN, ALTERNATING QUICK LUNGES  NEEDS RE-EVAL. DISCUSSED DECREASING TO 1X/WK.     Treatment Charges: Mins Units   [x]  Modalities 15  1   [x]  Ther Exercise 15 1   []  Manual Therapy       []  Ther Activities       []  Aquatics       [x]  Neuromuscular 30 2   []  Other       Total Treatment time 60 4         Assessment:  [x] Progressing toward goals.                         [] No change.                          [] Other:     Problems:                                                                                     []  1.  Vertigo  [x]  2.  Difficulty walking a straight course                                    []  3.  Positive [place] Hallpike                                               []  4.  Static balance deficit: mCTSIB  [x]  5.  Dynamic balance deficit: Gallardo Balance Scale (BBS), Dynamic Gait Index (DGI), Functional Gait Assessment (FGA) TBD  []  6.  Increased Dizziness Handicap Inventory (DHI)   [x]  7.  Increased Post Concussion Symptom SCALE (PCSS)  [x]  8.  (+) GAZE HOLDING NYSTAGMUS INDICATING VESTIBULAR HYPOFUNCTION, SYMPTOM PROVOCATION WITH OCULOMOTOR ACTIVITIES, (+) ANXIETY         Short Term Goals: ( 4-12  Treatments)  [] 1. Decreased Vertigo  [x] 2. Improved gait mechanics, trajectory: PATIENT WILL BE ABLE TO RUN ON A TREADMILL W/O VEERING  [x] 3. PATIENT WILL REPORT 75% IMPROVEMENT IN SYMPTOMS DURING A 5 MILE RUN  [] 4. Improved static balance  [x] 5.  Improved dynamic balance: INCREASE FGA BY 3  [] 6. Decreased Dizziness Handicap Inventory [facility])  [x] 7. Decreased Post Concussion Symptom Scale (PCSS) (17/144) BY 10  [x] 8. Improve Vestibular Ocular Motor Screen (VOMS) (14) BY 8  [x] 9. Independent with Home Exercise Program (HEP)  [x] 10. Demonstrate knowledge of fall prevention  X  11.  2 OR LESS LINE DIFFERENCE IN STATIC VS DYNAMIC VISUAL ACUITY     Long Term Goals: ( TBD   Treatments)  [] 1.  [] 2.      Patient Goals: RETURN TO RUNNING, IMPROVE COORDINATION WHEN RUNNING, IMPROVE BALANCE     Pt. Education:  [x] Yes  [] No  [x] Reviewed Prior HEP/Ed/PROGRESSED  Method of Education: [x] Verbal  [x] Demo  [x] Written  Comprehension of Education:  [x] Verbalizes understanding. [x] Demonstrates understanding. [] Needs review.   [] Demonstrates/verbalizes HEP/Ed previously given.      Plan:    [x] Continue per plan of care.              [] Other:     Time In: 830            Time WZT: 605  Electronically signed by Pavan Rodas PT on 7/25/2019 at 8:20 AM no

## 2019-08-01 ENCOUNTER — HOSPITAL ENCOUNTER (OUTPATIENT)
Dept: PHYSICAL THERAPY | Age: 59
Setting detail: THERAPIES SERIES
Discharge: HOME | End: 2019-08-01
Payer: COMMERCIAL

## 2019-08-01 PROCEDURE — 97112 NEUROMUSCULAR REEDUCATION: CPT

## 2019-08-01 PROCEDURE — 97530 THERAPEUTIC ACTIVITIES: CPT

## 2019-08-01 NOTE — PROGRESS NOTES
Physical Therapy               [] [facility]  Outpatient Rehabilitation &  Therapy  [address]  [phone]  F: [phone] [] [facility] [address] [place]  Outpatient Rehabilitation &  Therapy  [address]   [address]  P: [phone]  F: [phone] [x] [facility] &  Therapy  [address] [facility]  P: [phone]  F: [phone] [] [address]  [address]  [address]  P: [address]  F: Kalen Thompson [address] &  Therapy  [facility]   [address]  P: [phone]  F: [phone]               Physical Therapy Daily Treatment Note/PROGRESS NOTE        Date:  2019  Patient Name:  Ramila Jaime               :  1960                     MRN: [id number]  Physician: KAMERON TAYLOR (SPORTS MEDICINE), Ramila Pino (PCP)                   Insurance: [address] [place] [address] Drive  Onset date: 2018             Next 's appt.: PRN  Visit# / total visits: 10/12                    Cancels/No Shows: 0/0     Subjective:  PATIENT REPORTS HE IS ABLE TO RUN ABOUT A HALF MILE AT A TIME WITH A RELAXED GAIT AND NOT BEING FEARFUL OF FALLING. HE STATES THIS IS THE FIRST TIME SINCE HIS ACCIDENT HE HAS BEEN ABLE TO DO THIS. HE REPORTS A 15% IMPROVEMENT IN IMBALANCE. HE REPORTS COMPLIANCE WITH HIS HOME PROGRAM OF SINGLE LEG BALANCING, JUMPING DRILLS, DYNAMIC WARM UP WITH HOPPING AND HIGH KNEES. HE IS USING THE [employer] TREADMILL TO WORK ON SYMMETRICAL STANCE AND STRIDE LENGTH BETWEEN PHYSICAL THERAPY SESSIONS. IN GENERAL HE IS MONITORING HIS ACTIVITY WORKING ON SYMMETRY RIGHT VS LEFT. HE NOTES CONTINUE DIFFICULTY WITH COORDINATED MOVEMENT LEFT LEG BUT IMPROVING. HE IS ATTEMPTING TO CYCLE WITH LEFT DOING EQUAL WORK, INCREASING SUZY AND WORKING ON PULLING UP.     HE IS SORE IN THE RIGHT GASTROC TODAY. 2 DAYS AGO HE DID A HILL WORKOUT. DISCUSSED WITH HIM HE DOES NOT ABSORB SHOCK WELL AND TENDS TO LAND STIFF. HE WAS ENCOURAGED TO NOT \"GET OFF THE LEFT SO FAST\" GOING DOWN HILLS AND SOFTEN HIS FOOT STRIKE.        Pain:  [x] Yes  [] No          Location:  L GASTROC Pain Rating: (0-10 scale) 2/10  Pain altered Tx:  [x] No  [] Yes  Action:  Objective:  Precautions: NONE IDENTIFIED       JR Test:    Score Card- EC ALL Firm Surface Foam Surface   Double Leg Stance (Feet Together) 0 0   Single Leg Stance (Non-Dominant Foot) 5 6   Tandem Stance (Non-Dominant Foot in Back) 2 5   Total Scores: 7 11   JR Total:  18     Types of Errors  1) Hands lifted off iliac crest  2) Opening eyes  3) Step, stumble, or fall  4) Moving hip into >30 deg ABD  5) Lifting forefoot or heel  6) Remaining out of test position> 5 sec    The JR is calculated by adding one error point for each error during the 6 20-second tests. NO SIGNIFICANT CHANGE     VESTIBULAR OCULAR MOTOR SCREEN    Vestibular/   Oculomotor Not tested  Headache  Dizziness  Nausea  Fogginess  Comment   Baseline  Symptoms  0 0 0 0    Smooth   Pursuit  0 0 0 0    Saccades  (Horizontal)  0 0 0 0    Saccades  (Vertical)  0 0 0 0    Convergence  (Near Point)  0 0 0 0 Score #1:< 3 cm  Score #2: < 3 cm  Score #3:   < 3 cm   VOR  Horizontal  0 0 0 0    VOR  Vertical  0 0 0 0    Visual Motion  Sensitivity  0 0 0 0    TOTALS  0 0 0 0 TOTAL 0   IMPROVED BY 14    POST CONCUSSION SYMPTOM SCALE: 6/24 SYMPTOMS PRESENT FOR A TOTAL OF 7/144  NERVIOUS ANXIOUS 2/6, BALANCE PROBLEMS 1/6, SADNESS 1/6, FEELING MORE EMOTIONAL 1/6, NUMBNESS/TINGLING (B TOES) 1/6, FEELING IN FOG 1/6  IMPROVED BY 10    FUNCTIONAL GAIT ASSESSMENT: 29/30  IMPROVED BY 3    PATIENT REQUEST CHECK OF HIP FLEXOR FLEXIBILITY, (+) B ELIO TEST, PATIENT SHOWN HIP FLEXOR AND RF STRETCH.   NOTED HE DOES HAVE LEFT HIP FLEXOR WEAKNESS WHEN HIP IS EXTENDED       Exercises:  Exercise Reps/ Time

## 2019-08-07 ENCOUNTER — HOSPITAL ENCOUNTER (OUTPATIENT)
Dept: PHYSICAL THERAPY | Facility: CLINIC | Age: 59
Setting detail: THERAPIES SERIES
Discharge: HOME | End: 2019-08-07
Payer: COMMERCIAL

## 2019-08-07 PROCEDURE — 97140 MANUAL THERAPY 1/> REGIONS: CPT

## 2019-08-07 NOTE — PROGRESS NOTES
Employed                  []  Disability  [] Other:           Return to work:   Job/ADL Description: Retired [employer]  Pain:  [] Yes  [x] No Location:  Pain Rating: (0-10 scale) 0/10  Symptoms:  [x] Improving [] Worsening [] Same  Better:  [] AM    [] PM    [x] Sit    [] Rise/Sit    []Stand    [] Walk    [x] Lying    [] Other:  Worse: [] AM    [] PM    [] Sit    [] Rise/Sit    []Stand    [] Walk    [] Lying    [] [place]                             [] Valsalva    [x] Other: with running  Sleep: [] OK    [] Disturbed    Objective:  POSTURE No deficit Deficit Not Tested   Kyphosis [] [] []   Scoliosis [] [] []   Forward Head [] [x] []   Rounded [place] [] [x] []   Slumped Sitting [] [] []   Skin Integrity [x] [] []         NEUROLOGICAL      Reflexes [] [] [x]   Sensation [] [x] []   Bladder/Bowel [x] [] []   Coordination [] [x] []   Tone [x] [] []   Clonus [x] [] []   Tremors [x] [] []     FUNCTION INDEP MIN ASSIST MOD ASSIST MAX ASSIST NOT TESTED   Balance        -sitting static [x] [] [] [] []   -dynamic [x] [] [] [] []   -standing static [x] [] [] [] []   -dynamic [] [x] [] [] []   Ambulation [x] [] [] [] []   Distance/Device:   Analysis: See previous notes of Zoraida Talbert, PT     Comments: Diminished CSF flow of cranial vault by 50% L, 25% R; Diminished amplitude, rhythm, symmetry noted in CSF flow L > R cranial vault. Tension noted in the L > R temporal, frontal and occipital regions. Cervical tightness noted as well with decreased flow of dural tube from lower c-spine through the cranial vault. Assessment:  Problems:    [] ? Pain:     [] ? ROM:    [] ? Strength:    [x] ? Function: Difficulty running  [x] ? Balance  [x] ? Coordination  [] Postural Deviations  [x] Gait Deviations  [] Tone  [x] Other: Diminished CSF flow throughout c-spine/dural tube and cranial vault      STG: (to be met in 4-6 treatments)  1. ? Function: Improve running per patient report.     2. Independent with Home Exercise

## 2019-08-13 ENCOUNTER — HOSPITAL ENCOUNTER (OUTPATIENT)
Dept: PHYSICAL THERAPY | Age: 59
Setting detail: THERAPIES SERIES
Discharge: HOME | End: 2019-08-13
Payer: COMMERCIAL

## 2019-08-15 ENCOUNTER — HOSPITAL ENCOUNTER (OUTPATIENT)
Dept: PHYSICAL THERAPY | Facility: CLINIC | Age: 59
Setting detail: THERAPIES SERIES
Discharge: HOME | End: 2019-08-15
Payer: COMMERCIAL

## 2019-08-15 PROCEDURE — 97140 MANUAL THERAPY 1/> REGIONS: CPT

## 2019-08-15 NOTE — FLOWSHEET NOTE
treatments)  1. ? Function: Improve running per patient report. 2. Independent with Home Exercise Programs  3. Demonstrate Knowledge of fall prevention  LTG: (to be met in 12+ treatments)  1. Pt to be able to run 5+ miles with no anxiety/fear of falling  2. Increase CSF flow to 80% or > in the cranial vault and dural tube. 3.  Balance of CSF flow bilaterally        Patient goals: Increase running ability and endurance with no anxiety/fear of falling        Pt. Education:  [x] Yes  [] No  [x] Reviewed Prior HEP/Ed  Method of Education: [] Verbal  [] Demo  [] Written  Comprehension of Education:  [x] Verbalizes understanding. [] Demonstrates understanding. [] Needs review. [x] Demonstrates/verbalizes HEP/Ed previously given. Plan: [x] Continue per plan of care.    [] Other:      Time In:0630        Time Out: 0730    Electronically signed by:  Alexa Leon, PT

## 2019-08-16 ENCOUNTER — HOSPITAL ENCOUNTER (OUTPATIENT)
Dept: PHYSICAL THERAPY | Age: 59
Setting detail: THERAPIES SERIES
Discharge: HOME | End: 2019-08-16
Payer: COMMERCIAL

## 2019-08-16 PROCEDURE — 97110 THERAPEUTIC EXERCISES: CPT

## 2019-08-16 PROCEDURE — 97112 NEUROMUSCULAR REEDUCATION: CPT

## 2019-08-16 NOTE — PROGRESS NOTES
INCREASE FGA BY 3 (MET)  [] 6. Decreased Dizziness Handicap Inventory [facility])  [x] 7. Decreased Post Concussion Symptom Scale (PCSS) (17/144) BY 10 (MET)  [x] 8. Improve Vestibular Ocular Motor Screen (VOMS) (14) BY 8 (MET)  [x] 9. Independent with Home Exercise Program (MET)  [x] 10. Demonstrate knowledge of fall prevention  X  11.  2 OR LESS LINE DIFFERENCE IN STATIC VS DYNAMIC VISUAL ACUITY (NOT TESTED)     Long Term Goals: ( 24 Treatments) (NEW 8/1/2019)  [x] 1. SINGLE LEG STANCE LEFT WITH EYES CLOSED >30\"  [x] 2. IMPROVE JR BY 4    Patient Goals: RETURN TO RUNNING, IMPROVE COORDINATION WHEN RUNNING, IMPROVE BALANCE     Pt. Education:  [x] Yes  [] No  [x] Reviewed Prior HEP/Ed/PROGRESSED  Method of Education: [x] Verbal  [x] Demo  [] Written  Comprehension of Education:  [x] Verbalizes understanding. [x] Demonstrates understanding. [] Needs review.   [] Demonstrates/verbalizes HEP/Ed previously given.      Plan:    [x] Continue per plan of care.              [] Other:     Time In: 1205        Time Out: 1300  Electronically signed Jannet Leyva, PT

## 2019-08-20 ENCOUNTER — HOSPITAL ENCOUNTER (OUTPATIENT)
Dept: PHYSICAL THERAPY | Age: 59
Setting detail: THERAPIES SERIES
Discharge: HOME | End: 2019-08-20
Payer: COMMERCIAL

## 2019-08-20 PROCEDURE — 97110 THERAPEUTIC EXERCISES: CPT

## 2019-08-20 PROCEDURE — 97112 NEUROMUSCULAR REEDUCATION: CPT

## 2019-08-20 NOTE — PROGRESS NOTES
Modalities:  D/C ESTIM DUE TO IMPROVED DF STREGTH     Other: CUES OF LIGHT LANDING WITH JUMPING ACTIVITY, EXAGGERATE LANDING. DOING WELL WITH 1X/WEEK AND INCORPORATING THERAPY ACTIVITIES INTO HOME PROGRAM.      Specific Instructions for next treatment:  EYES CLOSED ACTIVITIES, CONTINUE TO WORK ON L LE COORDINATION, BALANCE AND STRENGTHENING.        Treatment Charges: Mins Units   []  Modalities       [x]  Ther Exercise  25  2   []  Manual Therapy       [x]  Ther Activities       []  Aquatics       [x]  Neuromuscular 30 2   []  Other       Total Treatment time 60 4         Assessment:  [x] Progressing toward goals.                         [] No change.                          [] Other:     Problems:                                                                                     []  1.  Vertigo  [x]  2.  Difficulty walking a straight course                                    []  3.  Positive [place]                                               []  4.  Static balance deficit: mCTSIB  [x]  5.  Dynamic balance deficit: Gallardo Balance Scale (BBS), Dynamic Gait Index (DGI), Functional Gait Assessment (FGA) TBD  []  6.  Increased Dizziness Handicap Inventory (DHI)   [x]  7.  Increased Post Concussion Symptom SCALE (PCSS)  [x]  8.  (+) GAZE HOLDING NYSTAGMUS INDICATING VESTIBULAR HYPOFUNCTION, SYMPTOM PROVOCATION WITH OCULOMOTOR ACTIVITIES, (+) ANXIETY         Short Term Goals: ( 4-12  Treatments)  [] 1. Decreased Vertigo  [x] 2. Improved gait mechanics, trajectory: PATIENT WILL BE ABLE TO RUN ON A TREADMILL W/O VEERING (IMPROVED NOT MET)  [x] 3. PATIENT WILL REPORT 75% IMPROVEMENT IN SYMPTOMS DURING A 5 MILE RUN (IMPROVED NOT MET)  [] 4. Improved static balance  [x] 5. Improved dynamic balance: INCREASE FGA BY 3 (MET)  [] 6. Decreased Dizziness Handicap Inventory [facility])  [x] 7. Decreased Post Concussion Symptom Scale (PCSS) (17/144) BY 10 (MET)  [x] 8.  Improve Vestibular Ocular Motor Screen (VOMS) (14) BY 8

## 2019-08-21 ENCOUNTER — HOSPITAL ENCOUNTER (OUTPATIENT)
Dept: PHYSICAL THERAPY | Facility: CLINIC | Age: 59
Setting detail: THERAPIES SERIES
Discharge: HOME | End: 2019-08-21
Payer: COMMERCIAL

## 2019-08-21 PROCEDURE — 97140 MANUAL THERAPY 1/> REGIONS: CPT

## 2019-08-21 NOTE — FLOWSHEET NOTE
dural tube and cranial vault. [] No change. [] Other:       STG: (to be met in 4-6 treatments)  1. ? Function: Improve running per patient report. 2. Independent with Home Exercise Programs  3. Demonstrate Knowledge of fall prevention  LTG: (to be met in 12+ treatments)  1. Pt to be able to run 5+ miles with no anxiety/fear of falling  2. Increase CSF flow to 80% or > in the cranial vault and dural tube. 3.  Balance of CSF flow bilaterally        Patient goals: Increase running ability and endurance with no anxiety/fear of falling        Pt. Education:  [x] Yes  [] No  [x] Reviewed Prior HEP/Ed  Method of Education: [] Verbal  [] Demo  [] Written  Comprehension of Education:  [x] Verbalizes understanding. [] Demonstrates understanding. [] Needs review. [x] Demonstrates/verbalizes HEP/Ed previously given. Plan: [x] Continue per plan of care.    [] Other:      Time In:1100        Time Out: 1200    Electronically signed by:  Curt Gonzalez, PT

## 2019-08-28 ENCOUNTER — HOSPITAL ENCOUNTER (OUTPATIENT)
Dept: PHYSICAL THERAPY | Facility: CLINIC | Age: 59
Setting detail: THERAPIES SERIES
End: 2019-08-28
Payer: COMMERCIAL

## 2019-08-28 ENCOUNTER — HOSPITAL ENCOUNTER (OUTPATIENT)
Dept: PHYSICAL THERAPY | Age: 59
Setting detail: THERAPIES SERIES
Discharge: HOME | End: 2019-08-28
Payer: COMMERCIAL

## 2019-08-28 PROCEDURE — 97112 NEUROMUSCULAR REEDUCATION: CPT

## 2019-08-28 PROCEDURE — 97110 THERAPEUTIC EXERCISES: CPT

## 2019-09-04 ENCOUNTER — HOSPITAL ENCOUNTER (OUTPATIENT)
Dept: PHYSICAL THERAPY | Age: 59
Setting detail: THERAPIES SERIES
Discharge: HOME | End: 2019-09-04
Payer: COMMERCIAL

## 2019-09-04 ENCOUNTER — HOSPITAL ENCOUNTER (OUTPATIENT)
Dept: PHYSICAL THERAPY | Facility: CLINIC | Age: 59
Setting detail: THERAPIES SERIES
Discharge: HOME | End: 2019-09-04
Payer: COMMERCIAL

## 2019-09-04 PROCEDURE — 97112 NEUROMUSCULAR REEDUCATION: CPT

## 2019-09-04 PROCEDURE — 97140 MANUAL THERAPY 1/> REGIONS: CPT

## 2019-09-04 PROCEDURE — 97110 THERAPEUTIC EXERCISES: CPT

## 2019-09-04 NOTE — PROGRESS NOTES
Dizziness Handicap Inventory [facility])  [x] 7. Decreased Post Concussion Symptom Scale (PCSS) (17/144) BY 10 (MET)  [x] 8. Improve Vestibular Ocular Motor Screen (VOMS) (14) BY 8 (MET)  [x] 9. Independent with Home Exercise Program (MET)  [x] 10. Demonstrate knowledge of fall prevention  X  11.  2 OR LESS LINE DIFFERENCE IN STATIC VS DYNAMIC VISUAL ACUITY (NOT TESTED)     Long Term Goals: ( 24 Treatments) (NEW 8/1/2019)  [x] 1. SINGLE LEG STANCE LEFT WITH EYES CLOSED >30\"  [x] 2. IMPROVE JR BY 4    Patient Goals: RETURN TO RUNNING, IMPROVE COORDINATION WHEN RUNNING, IMPROVE BALANCE     Pt. Education:  [x] Yes  [] No  [x] Reviewed Prior HEP/Ed/PROGRESSED  Method of Education: [x] Verbal  [x] Demo  [] Written  Comprehension of Education:  [x] Verbalizes understanding. [x] Demonstrates understanding. [] Needs review.   [] Demonstrates/verbalizes HEP/Ed previously given.      Plan:    [x] Continue per plan of care.              [] Other:     Time In: 700       Time LYC: 629  Electronically signed Dwaine Esposito PT

## 2019-09-11 ENCOUNTER — HOSPITAL ENCOUNTER (OUTPATIENT)
Dept: PHYSICAL THERAPY | Facility: CLINIC | Age: 59
Setting detail: THERAPIES SERIES
Discharge: HOME | End: 2019-09-11
Payer: COMMERCIAL

## 2019-09-11 ENCOUNTER — HOSPITAL ENCOUNTER (OUTPATIENT)
Dept: PHYSICAL THERAPY | Age: 59
Setting detail: THERAPIES SERIES
Discharge: HOME | End: 2019-09-11
Payer: COMMERCIAL

## 2019-09-11 PROCEDURE — 97110 THERAPEUTIC EXERCISES: CPT

## 2019-09-11 PROCEDURE — 97140 MANUAL THERAPY 1/> REGIONS: CPT

## 2019-09-11 PROCEDURE — 97112 NEUROMUSCULAR REEDUCATION: CPT

## 2019-09-18 ENCOUNTER — HOSPITAL ENCOUNTER (OUTPATIENT)
Dept: PHYSICAL THERAPY | Age: 59
Setting detail: THERAPIES SERIES
Discharge: HOME | End: 2019-09-18
Payer: COMMERCIAL

## 2019-09-18 PROCEDURE — 97110 THERAPEUTIC EXERCISES: CPT

## 2019-09-18 PROCEDURE — 97112 NEUROMUSCULAR REEDUCATION: CPT

## 2019-09-18 PROCEDURE — G0283 ELEC STIM OTHER THAN WOUND: HCPCS

## 2019-09-19 ENCOUNTER — HOSPITAL ENCOUNTER (OUTPATIENT)
Dept: PHYSICAL THERAPY | Facility: CLINIC | Age: 59
Setting detail: THERAPIES SERIES
End: 2019-09-19
Payer: COMMERCIAL

## 2019-09-19 NOTE — PROGRESS NOTES
Physical Therapy         [] [address]  Outpatient Rehabilitation &  Therapy  [address]  [phone]  F: [phone] [] [facility] &  Therapy  [address]   [address]  P: [phone]  F: [phone] [x] [facility] &  Therapy  [address] [facility]  P: [phone]  F: [phone] [] [address]  [address]  [address]  P: [phone]  F: [address] &  Therapy  [facility]   [address]  P: [phone]  F: [phone]               Physical Therapy Daily Treatment Note/PROGRESS NOTE        Date:  2019  Patient Name:  Ramila Jaime               :  1960                     MRN: [id number]  Physician: KAMERON TAYLOR (SPORTS MEDICINE), Yanick Steiner (PCP)                   Insurance: [address] [place] [address] Drive  Onset date: 2018             Next Dr's appt.: PRN  Visit# / total visits:                     Cancels/No Shows: 1/0     Subjective:  PATIENT REPORTS HE WAS ABLE TO RUN 8 MILES W/O STOPPING. STATES HE IS VERY FATIGUED TODAY DUE TO MOVING HIS LONG RUN UP TO YESTERDAY. STATES HE HAS RUN >60 MILES IN THE LAST 4 DAYS. HE IS WILLING TO TRY THERAPY TODAY. DENIES SORENESS AND PAIN.   STATE HE JUST FEELS STIFF, FATIGUED AND TIRED.       Pain:  [] Yes  [x] No          Location:         Pain Rating: (0-10 scale) 0/10  Pain altered Tx:  [x] No  [] Yes  Action:  Objective:  Precautions: NONE IDENTIFIED     Exercises:  Exercise Reps/ Time Weight/ Level Comments COMPLETED   GAZE STAB X1 (2 DIRECTIONS) FWD FAST WALKING 60\"   2-3X/DAY HEP  PROGRESSED 7/2      VOR CANC (4 DIRECTIONS) FWD FAST WALKING 60\"   2-3X/DAY HEP  PROGRESSED 7/2     SLS L      AMAP HEP                 ALTER G:           GAIT TREADMILL W/O VEERING (IMPROVED NOT MET)  [x] 3. PATIENT WILL REPORT 75% IMPROVEMENT IN SYMPTOMS DURING A 5 MILE RUN (IMPROVED NOT MET)  [] 4. Improved static balance  [x] 5. Improved dynamic balance: INCREASE FGA BY 3 (MET)  [] 6. Decreased Dizziness Handicap Inventory [facility])  [x] 7. Decreased Post Concussion Symptom Scale (PCSS) (17/144) BY 10 (MET)  [x] 8. Improve Vestibular Ocular Motor Screen (VOMS) (14) BY 8 (MET)  [x] 9. Independent with Home Exercise Program (MET)  [x] 10. Demonstrate knowledge of fall prevention  X  11.  2 OR LESS LINE DIFFERENCE IN STATIC VS DYNAMIC VISUAL ACUITY (NOT TESTED)     Long Term Goals: ( 24 Treatments) (NEW 8/1/2019)  [x] 1. SINGLE LEG STANCE LEFT WITH EYES CLOSED >30\"  [x] 2. IMPROVE JR BY 4    Patient Goals: RETURN TO RUNNING, IMPROVE COORDINATION WHEN RUNNING, IMPROVE BALANCE     Pt. Education:  [x] Yes  [] No  [x] Reviewed Prior HEP/Ed/PROGRESSED  Method of Education: [x] Verbal  [x] Demo  [] Written  Comprehension of Education:  [x] Verbalizes understanding. [x] Demonstrates understanding. [] Needs review.   [] Demonstrates/verbalizes HEP/Ed previously given.      Plan:    [x] Continue per plan of care.              [] Other:     Time In: 700       Time Out: 750  Electronically signed Shaniqua Gilmore, PT

## 2019-09-30 ENCOUNTER — APPOINTMENT (OUTPATIENT)
Dept: PHYSICAL THERAPY | Facility: CLINIC | Age: 59
End: 2019-09-30
Payer: COMMERCIAL

## 2019-10-02 ENCOUNTER — HOSPITAL ENCOUNTER (OUTPATIENT)
Dept: PHYSICAL THERAPY | Facility: CLINIC | Age: 59
Setting detail: THERAPIES SERIES
Discharge: HOME | End: 2019-10-02
Payer: COMMERCIAL

## 2019-10-02 PROCEDURE — 97140 MANUAL THERAPY 1/> REGIONS: CPT

## 2019-10-09 ENCOUNTER — HOSPITAL ENCOUNTER (OUTPATIENT)
Dept: PHYSICAL THERAPY | Facility: CLINIC | Age: 59
Setting detail: THERAPIES SERIES
Discharge: HOME | End: 2019-10-09
Payer: COMMERCIAL

## 2019-10-09 PROCEDURE — 97140 MANUAL THERAPY 1/> REGIONS: CPT

## 2019-11-04 ENCOUNTER — HOSPITAL ENCOUNTER (OUTPATIENT)
Dept: PHYSICAL THERAPY | Facility: CLINIC | Age: 59
Setting detail: THERAPIES SERIES
Discharge: HOME | End: 2019-11-04
Payer: COMMERCIAL

## 2019-11-04 PROCEDURE — 97140 MANUAL THERAPY 1/> REGIONS: CPT

## 2019-11-04 PROCEDURE — 97016 VASOPNEUMATIC DEVICE THERAPY: CPT

## 2019-11-07 ENCOUNTER — HOSPITAL ENCOUNTER (OUTPATIENT)
Dept: PHYSICAL THERAPY | Facility: CLINIC | Age: 59
Setting detail: THERAPIES SERIES
Discharge: HOME | End: 2019-11-07
Payer: COMMERCIAL

## 2019-11-07 PROCEDURE — 97112 NEUROMUSCULAR REEDUCATION: CPT

## 2019-11-07 PROCEDURE — 97140 MANUAL THERAPY 1/> REGIONS: CPT

## 2019-11-07 PROCEDURE — 97016 VASOPNEUMATIC DEVICE THERAPY: CPT

## 2019-11-14 ENCOUNTER — HOSPITAL ENCOUNTER (OUTPATIENT)
Dept: PHYSICAL THERAPY | Facility: CLINIC | Age: 59
Setting detail: THERAPIES SERIES
Discharge: HOME | End: 2019-11-14
Payer: COMMERCIAL

## 2019-11-14 PROCEDURE — 97016 VASOPNEUMATIC DEVICE THERAPY: CPT

## 2019-11-14 PROCEDURE — 97140 MANUAL THERAPY 1/> REGIONS: CPT

## 2020-01-22 ENCOUNTER — HOSPITAL ENCOUNTER (OUTPATIENT)
Dept: PHYSICAL THERAPY | Facility: CLINIC | Age: 60
Setting detail: THERAPIES SERIES
Discharge: HOME | End: 2020-01-22
Payer: COMMERCIAL

## 2020-01-22 PROCEDURE — 97016 VASOPNEUMATIC DEVICE THERAPY: CPT

## 2020-01-22 PROCEDURE — 97140 MANUAL THERAPY 1/> REGIONS: CPT

## 2020-01-22 PROCEDURE — 20560 NDL INSJ W/O NJX 1 OR 2 MUSC: CPT

## 2020-01-27 ENCOUNTER — HOSPITAL ENCOUNTER (OUTPATIENT)
Dept: PHYSICAL THERAPY | Facility: CLINIC | Age: 60
Setting detail: THERAPIES SERIES
Discharge: HOME | End: 2020-01-27
Payer: COMMERCIAL

## 2020-01-27 PROCEDURE — 97140 MANUAL THERAPY 1/> REGIONS: CPT

## 2020-01-27 PROCEDURE — 20560 NDL INSJ W/O NJX 1 OR 2 MUSC: CPT

## 2020-01-27 PROCEDURE — 97110 THERAPEUTIC EXERCISES: CPT

## 2020-01-27 NOTE — FLOWSHEET NOTE
[] [facility] [facility]  [address]  [phone]  F: [phone] [] [address]  [address]   [address]  P: [phone]  F: [phone] [x] [facility]  Therapy  [address]  P: [phone]  F: [phone] [] [address]  [facility]   [address]  [place]: [phone]  F: [phone]     Physical Therapy Daily Treatment Note/Re-eval    Date:  2020  Patient Name:  Tigist Ledesma    :  1960  MRN: [id number]  Physician: Rose Marie Pierre MD                 Insurance: [facility]  Medical Diagnosis: R HS strain, neuropathy; vertigo/dizziness, post-concussion syndrome, R PF, added post tib tendonitis R foot  20                Rehab Codes: R94.436E; G62.9; R42  Onset date: 17                 Next 's appt.: prn     Visit# / total visits:   Cancels/No Shows: 0    Subjective:    Pain:  [x] Yes  [] No Location: R PF/arch Pain Ratin-2/10 (0-10 scale); Hasn't tried running and has been feeling better. Comments: Pt reports he was running 4-5 times a week up to 10 miles at a time and noticed medial foot pain around [place] time. He reports he is still taking the Mobic. Objective: Mild pain at medial navicular with min swelling. Tightness noted in post tib muscle belly. Tendon not as tender. B hip abd (glut med) 4-/5 R, 3+/5 L, R glut max = 4+/5; L glut max = 4-/5; B psoas and gastroc soleus tightness; 75% hallux extension  B; RF varum: 6 degrees R, 8 degrees L; FF 4 degree B  Modalities:    Manual therapy 35 min: MFR/CST to FDL/B, post tib, mobs foot  IDN 10 min to (1-2 muscles) post tib/tendon attachment , surrounding soft tissue and homeostatic points.     Precautions:    Exercises: Rev of HEP  Exercise Reps/ Time Weight/ Level Comments    toe yoga  2/1 min

## 2020-01-30 ENCOUNTER — HOSPITAL ENCOUNTER (OUTPATIENT)
Dept: PHYSICAL THERAPY | Facility: CLINIC | Age: 60
Setting detail: THERAPIES SERIES
Discharge: HOME | End: 2020-01-30
Payer: COMMERCIAL

## 2020-01-30 PROCEDURE — 20561 NDL INSJ W/O NJX 3+ MUSC: CPT

## 2020-01-30 PROCEDURE — 20560 NDL INSJ W/O NJX 1 OR 2 MUSC: CPT

## 2020-01-30 PROCEDURE — 97140 MANUAL THERAPY 1/> REGIONS: CPT

## 2020-01-30 PROCEDURE — 97110 THERAPEUTIC EXERCISES: CPT

## 2020-01-30 NOTE — FLOWSHEET NOTE
[] [facility] &  Therapy  [address]  [phone]  F: [phone] [] [address]  [address]   [address]  P: [phone]  F: [phone] [x] [facility]  Therapy  [address] [facility]  P: [phone]  F: [phone] [] [address]  [facility]   [address]  [place]: [phone]  F: [phone]     Physical Therapy Daily Treatment Note/Re-eval    Date:  2020  Patient Name:  Albert Butts    :  1960  MRN: [id number]  Physician: Leary Bumpers, MD                 Insurance: [facility]  Medical Diagnosis: R HS strain, neuropathy; vertigo/dizziness, post-concussion syndrome, R PF, added post tib tendonitis R foot  20                Rehab Codes: Q98.315I; G62.9; R42  Onset date: 17                 Next 's appt.: prn     Visit# / total visits: 3/18  Cancels/No Shows: 0    Subjective:    Pain:  [x] Yes  [] No Location: R PF/arch Pain Ratin/10 (0-10 scale); Hasn't tried running and has been feeling better. Comments: Pt reports he has been feeling a lot better since backing off of running. Wants to try Harlan Glasgow today. Objective: Mild pain at medial navicular with min swelling. Tightness noted in post tib muscle belly. Tendon not as tender. B hip abd (glut med) 4-/5 R, 3+/5 L, R glut max = 4+/5; L glut max = 4-/5; B psoas and gastroc soleus tightness; 75% hallux extension  B; RF varum: 6 degrees R, 8 degrees L; FF 4 degree B  Modalities:    Manual therapy 35 min: MFR/CST to FDL/B, post tib, mobs foot  IDN 10 min to (1-2 muscles) post tib/tendon attachment , surrounding soft tissue and homeostatic points.     Precautions:    Exercises: Rev of HEP  Exercise Reps/ Time Weight/ Level Comments    toe yoga  2/1 min    HEP - emphasis on WB hallux    ball roll  2/1 min   HEP

## 2020-02-03 ENCOUNTER — HOSPITAL ENCOUNTER (OUTPATIENT)
Dept: PHYSICAL THERAPY | Facility: CLINIC | Age: 60
Setting detail: THERAPIES SERIES
Discharge: HOME | End: 2020-02-03
Payer: COMMERCIAL

## 2020-02-03 PROCEDURE — 97112 NEUROMUSCULAR REEDUCATION: CPT

## 2020-02-03 PROCEDURE — 97110 THERAPEUTIC EXERCISES: CPT

## 2020-02-03 PROCEDURE — 20560 NDL INSJ W/O NJX 1 OR 2 MUSC: CPT

## 2020-02-03 PROCEDURE — 97140 MANUAL THERAPY 1/> REGIONS: CPT

## 2020-02-03 NOTE — FLOWSHEET NOTE
30 2   []  Manual Therapy     []  Ther Activities     [x]  NMR 20 1   []  Vasocompression     []  DN 1-2 muscles     Total Treatment time 50 3       Assessment: [x] Progressing toward goals. [] No change. [x] Other:Pt is lacking mobility at ankle and great toe. This causes poor mechanics at the foot. Pt toes out and increases stress on PF. He also stays on his forefoot with running. WHen asked to make changes at LE's he increases thoracic extension and gets stiff at his hips which may explain the lack of hip extension present as well.    STG: (to be met in 4-6 treatments) - New goals set 1/22/20  1. Reduce pain to 1-2/10   2. Implement strength training regimen for core/LEs  3. Reduce swelling medial foot. LTG: (to be met in 12+ treatments)  1. Pt to be able to run without pain   2.   5/5 strength R ankle        Patient goals: New goal: Return to being able to train fully for the marathon (1/22/20)       Pt. Education:  [x] Yes  [] No  [x] Reviewed Prior HEP/Ed  Method of Education: [] Verbal  [] Demo  [] Written  Comprehension of Education:  [x] Verbalizes understanding. [] Demonstrates understanding. [] Needs review. [x] Demonstrates/verbalizes HEP/Ed previously given.      Plan: [x] Continue per plan of care prn.   [] Other:      Time In:1505   Time Out: 1600    Electronically signed by:  Adelfo Denise, PT

## 2020-02-06 ENCOUNTER — HOSPITAL ENCOUNTER (OUTPATIENT)
Dept: PHYSICAL THERAPY | Facility: CLINIC | Age: 60
Setting detail: THERAPIES SERIES
Discharge: HOME | End: 2020-02-06
Payer: COMMERCIAL

## 2020-02-06 PROCEDURE — 97140 MANUAL THERAPY 1/> REGIONS: CPT

## 2020-02-06 PROCEDURE — 97112 NEUROMUSCULAR REEDUCATION: CPT

## 2020-02-06 PROCEDURE — 97110 THERAPEUTIC EXERCISES: CPT

## 2020-02-06 NOTE — FLOWSHEET NOTE
[] [facility]) [facility] &  Therapy  [address]  [phone]  F: [phone] [] [address]  [address]   [address]  P: [phone]  F: [phone] [x] [address] &  Therapy  [address]  P: [phone]  F: [phone] [] [address]  [facility]   [address]  [place]: [phone]  F: [phone]     Physical Therapy Daily Treatment Note    Date:  2020  Patient Name:  Alexei Herrmann    :  1960  MRN: [id number]  Physician: Toni Dubon MD                 Insurance: [facility]  Medical Diagnosis: R HS strain, neuropathy; vertigo/dizziness, post-concussion syndrome                   Rehab Codes: W03.693Q; G62.9; R42  Onset date: 17                 Next 's appt.: prn     Visit# / total visits:   Cancels/No Shows: 0    Subjective:    Pain:  [] Yes  [] No Location:  N/A Pain Rating: (0-10 scale) Pt wanted treatment for his post-concussion syndrome today. Comments:    Objective: Reduced mid brain CSF flow with decreased quality and symmetry by 45%  Modalities:  Manual therapy 60 min: CST/MFR to pelvic diaphragm, t-inlet, hyoid release, cranial vault, parietal release, temporal release, ear pull temporal release, sphenobasilar release, maxilla flex/ext release with shear, vomer release, brain work, still point induction, VI &D  Precautions:    Specific Instructions for next treatment:    Treatment Charges: Mins Units   []  Modalities     []  Ther Exercise     [x]  Manual Therapy 60 4   []  Ther Activities     []  Aquatics     []  Vasocompression     []  Other     Total Treatment time 60 4       Assessment: [x]  Continued improvement in balance and confidence with running.   Also continued reduction noted with anxiety with running and improved  Improved CSF flow by 20-25% in mid brain with improved symmetry and quality of rhythm after treatment in the cranial vault and dural tube. Progressing with long term goals. [] No change. [] Other:       STG: (to be met in 4-6 treatments) - Achieved   1. ? Function: Improve running per patient report. 2. Independent with Home Exercise Programs  3. Demonstrate Knowledge of fall prevention  LTG: (to be met in 12+ treatments)  1. Pt to be able to run 5+ miles with no anxiety/fear of falling - achieved  2. Increase CSF flow to 80% or > in the cranial vault and dural tube- progressing. 3.  Balance of CSF flow bilaterally - progressing        Patient goals: Increase running ability and endurance with no anxiety/fear of falling        Pt. Education:  [x] Yes  [] No  [x] Reviewed Prior HEP/Ed  Method of Education: [] Verbal  [] Demo  [] Written  Comprehension of Education:  [x] Verbalizes understanding. [] Demonstrates understanding. [] Needs review. [x] Demonstrates/verbalizes HEP/Ed previously given.      Plan: [x] Continue per plan of care prn.   [] Other:      Time In:1300    Time Out: 1400    Electronically signed by:  Derik Rios, PT

## 2020-02-06 NOTE — FLOWSHEET NOTE
Continue to work on ankle mobility and posture     Treatment Charges: Mins Units   []  Modalities     [x]  Ther Exercise 30 2   [x]  Manual Therapy 15 1   []  Ther Activities     [x]  NMR 20 1   []  Vasocompression     []  DN 1-2 muscles     Total Treatment time 65 4       Assessment: [x] Progressing toward goals. [] No change. [x] Other:Increased work on [employer] today. With single leg squat pt is quick to fatigue at the glutes. Needed cues to hinge the hip so he can engage the glute. More posturally aware in AlterG but unable to hold due to fatigue. STG: (to be met in 4-6 treatments) - New goals set 1/22/20  1. Reduce pain to 1-2/10   2. Implement strength training regimen for core/LEs  3. Reduce swelling medial foot. LTG: (to be met in 12+ treatments)  1. Pt to be able to run without pain   2.   5/5 strength R ankle        Patient goals: New goal: Return to being able to train fully for the marathon (1/22/20)       Pt. Education:  [x] Yes  [] No  [x] Reviewed Prior HEP/Ed  Method of Education: [] Verbal  [] Demo  [] Written  Comprehension of Education:  [x] Verbalizes understanding. [] Demonstrates understanding. [] Needs review. [x] Demonstrates/verbalizes HEP/Ed previously given.      Plan: [x] Continue per plan of care prn.   [] Other:      Time In:1000   Time Out: 1130    Electronically signed by:  Twila Rao, PT

## 2020-02-13 ENCOUNTER — HOSPITAL ENCOUNTER (OUTPATIENT)
Dept: PHYSICAL THERAPY | Facility: CLINIC | Age: 60
Setting detail: THERAPIES SERIES
Discharge: HOME | End: 2020-02-13
Payer: COMMERCIAL

## 2020-02-13 PROCEDURE — 97112 NEUROMUSCULAR REEDUCATION: CPT

## 2020-02-13 PROCEDURE — 97110 THERAPEUTIC EXERCISES: CPT

## 2020-02-13 PROCEDURE — 97140 MANUAL THERAPY 1/> REGIONS: CPT

## 2020-02-13 NOTE — FLOWSHEET NOTE
vaso this date  Freeze sleeve to foot to end x 15min. Specific Instructions for next treatment: Continue to work on ankle mobility and posture     Treatment Charges: Mins Units   []  Modalities     [x]  Ther Exercise 35 2   [x]  Manual Therapy 20 1   []  Ther Activities     [x]  NMR 10 1   []  Vasocompression     []  DN 1-2 muscles     Total Treatment time 65 4       Assessment: [x] Progressing toward goals. [] No change. [x] Other:Quick to fatigue with posture bridge with march. Corrections made to R foot on MOBO to get more FHB activation and prevent valgus collapse at the arch. Fatigue by 2.5 miles on AlterG at 60% has pt stiffening up and using Tspine extension causing higher joselito and getting more to forefoot. Pt is able to correct with cues but for limited time. STG: (to be met in 4-6 treatments) - New goals set 1/22/20  1. Reduce pain to 1-2/10   2. Implement strength training regimen for core/LEs  3. Reduce swelling medial foot. LTG: (to be met in 12+ treatments)  1. Pt to be able to run without pain   2.   5/5 strength R ankle        Patient goals: New goal: Return to being able to train fully for the marathon (1/22/20)       Pt. Education:  [x] Yes  [] No  [x] Reviewed Prior HEP/Ed  Method of Education: [] Verbal  [] Demo  [] Written  Comprehension of Education:  [x] Verbalizes understanding. [] Demonstrates understanding. [] Needs review. [x] Demonstrates/verbalizes HEP/Ed previously given.      Plan: [x] Continue per plan of care prn.   [] Other:      Time In:1400   Time Out: 5225    Electronically signed by:  Valeria Wood, PT

## 2020-02-18 ENCOUNTER — HOSPITAL ENCOUNTER (OUTPATIENT)
Dept: PHYSICAL THERAPY | Facility: CLINIC | Age: 60
Setting detail: THERAPIES SERIES
Discharge: HOME | End: 2020-02-18
Payer: COMMERCIAL

## 2020-02-18 PROCEDURE — 97110 THERAPEUTIC EXERCISES: CPT

## 2020-02-18 PROCEDURE — 97140 MANUAL THERAPY 1/> REGIONS: CPT

## 2020-02-18 NOTE — FLOWSHEET NOTE
[] [facility]) [facility] &  Therapy  [address]  [phone]  F: [phone] [] [address]  [employer] [address]   [address]  P: [phone]  F: [phone] [x] [address]  Therapy  [address]  P: [phone]  F: [phone] [] [address]  [facility]   [address]  [place]: [phone]  F: [phone]     Physical Therapy Daily Treatment Note    Date:  2020  Patient Name:  Orman Holstein    :  1960  MRN: [id number]  Physician: Linda Bustos MD                 Insurance: [facility]  Medical Diagnosis: R HS strain, neuropathy; vertigo/dizziness, post-concussion syndrome, R PF, added post tib tendonitis R foot  20                Rehab Codes: U13.459C; G62.9; R42  Onset date: 17                 Next 's appt.: prn     Visit# / total visits:   Cancels/No Shows: 0    Subjective:    Pain:  [x] Yes  [] No Location: R PF/arch Pain Ratin-2/10 (0-10 scale); Comments: Pt already did run on AlterG prior to visit. Objective:   Hawkgrips: B calves x 15 min. Precautions:    Exercises: Rev of HEP  Exercise Reps/ Time Weight/ Level Comments    toe yoga  x30  x  HEP - emphasis on WB hallux    Lax ball self mob Lonly  1'  x HEP   Posture bridge /march  2x10  x     monster walks          tanisha hip abd        Mobo board foot rocks x30 x 1/3,2/4   Mobo single leg squat x20 x    Alter G           TM run 3miles/6. 0mph  70%BW Cues for lifting heel at toe off and cue for posture                     Other: No vaso this date    Specific Instructions for next treatment: Continue to work on ankle mobility and posture     Treatment Charges: Mins Units   []  Modalities     [x]  Ther Exercise 30 2   [x]  Manual Therapy 15 1   []  Ther Activities     []  NMR     []  Vasocompression     [] DN 1-2 muscles     Total Treatment time 45 4       Assessment: [x] Progressing toward goals. [] No change. [x] Other:Pt is doing a better job holding posture. Ankle mobility improving. STG: (to be met in 4-6 treatments) - New goals set 1/22/20  1. Reduce pain to 1-2/10   2. Implement strength training regimen for core/LEs  3. Reduce swelling medial foot. LTG: (to be met in 12+ treatments)  1. Pt to be able to run without pain   2.   5/5 strength R ankle        Patient goals: New goal: Return to being able to train fully for the marathon (1/22/20)       Pt. Education:  [x] Yes  [] No  [x] Reviewed Prior HEP/Ed  Method of Education: [] Verbal  [] Demo  [] Written  Comprehension of Education:  [x] Verbalizes understanding. [] Demonstrates understanding. [] Needs review. [x] Demonstrates/verbalizes HEP/Ed previously given.      Plan: [x] Continue per plan of care prn.   [] Other:      Time In:1000   Time Out: 1130    Electronically signed by:  Migdalia Pulido, PT

## 2020-03-03 ENCOUNTER — HOSPITAL ENCOUNTER (OUTPATIENT)
Dept: PHYSICAL THERAPY | Facility: CLINIC | Age: 60
Setting detail: THERAPIES SERIES
Discharge: HOME | End: 2020-03-03
Payer: COMMERCIAL

## 2020-03-03 PROCEDURE — 97140 MANUAL THERAPY 1/> REGIONS: CPT

## 2020-03-03 PROCEDURE — 97110 THERAPEUTIC EXERCISES: CPT

## 2020-03-03 PROCEDURE — 97750 PHYSICAL PERFORMANCE TEST: CPT

## 2020-03-03 PROCEDURE — 97112 NEUROMUSCULAR REEDUCATION: CPT

## 2020-03-03 NOTE — FLOWSHEET NOTE
up: 2 min   Pace: 1000  min/mile   Duration: 5 minutes    Shoes: Zamudio Adrenaline with powerstep   Debby: 169 spm increased to 172   Frontal plane deviations:    Dynamic genu varus    Contralateral pelvic drop    Increased hip adduction/crossover    Lateral trunk bend L           Sagittal plane deviations:     Full knee extension at initial contact    Limited hip ext from midstance to toe off    Knees extend over toes at midstance    Increased forward/backward trunk lean       Other: Pt is a heel strike on R and forefoot on L. But the further into the run he gets he reverts to forefoot B and starts to demonstrate backward lean He also begins to use thoracic extension to cheat for lack of hip extension ROM on L. Recommendations:    Remove powerstep  NMR   Pace: 1000  min/mile   Duration: 15 minutes 5 cycles of run/walk   Shoes: Zamudio Adrenaline No powerstep   Debby:  172 Spm   Cue: Intermittent metronome use and cue to back off toes with shoulders back and down. Treatment Charges: Mins Units   []  Modalities     [x]  Ther Exercise 15 1   [x]  Manual Therapy 20 1   [x]  Phys Perf eval 10 1   [x]  NMR 15 1   []  Vasocompression     []  DN 1-2 muscles     Total Treatment time 60 4       Assessment: [x] Progressing toward goals. [] No change. [x] Other:Pt is lacking L hip extension ROM. Tests 4/5 L hip abd. Run form flaws as above. Pt is aerobically taxed as well. He may run every other day 4 cycles 3 on/2off with up 2-5 min RAMIREZ and CD. Focusing on posture and backing off forefoot at IC. STG: (to be met in 4-6 treatments) - New goals set 1/22/20  1. Reduce pain to 1-2/10   2. Implement strength training regimen for core/LEs  3. Reduce swelling medial foot. LTG: (to be met in 12+ treatments)  1. Pt to be able to run without pain   2.   5/5 strength R ankle        Patient goals: New goal: Return to being able to train fully for the marathon (1/22/20)       Pt.  Education:  [x] Yes  [] No  [x]

## 2020-03-17 ENCOUNTER — HOSPITAL ENCOUNTER (OUTPATIENT)
Dept: PHYSICAL THERAPY | Facility: CLINIC | Age: 60
Setting detail: THERAPIES SERIES
Discharge: HOME | End: 2020-03-17
Payer: COMMERCIAL

## 2020-03-17 PROCEDURE — 97112 NEUROMUSCULAR REEDUCATION: CPT

## 2020-03-17 PROCEDURE — 97110 THERAPEUTIC EXERCISES: CPT

## 2020-03-17 PROCEDURE — 97140 MANUAL THERAPY 1/> REGIONS: CPT

## 2020-03-17 NOTE — FLOWSHEET NOTE
[] [facility]) - [facility] &  Therapy  [address]  [phone]  F: [phone] [] [address]  [place] [address]   [address]  P: [phone]  F: [phone] [x] [place]  [address]  P: [phone]  F: [phone] [] [address]  [facility]   [address]  [place]: [phone]  F: [phone]     Physical Therapy Daily Treatment Note    Date:  3/17/2020  Patient Name:  Xiao Christensen    :  1960  MRN: [id number]  Physician: Nathan Tan MD                  Insurance: [facility]  Medical Diagnosis: R HS strain, neuropathy; vertigo/dizziness, post-concussion syndrome, R PF, added post tib tendonitis R foot  20                  Rehab Codes: B79.781E; G62.9; R42  Onset date: 17                   Next 's appt.: prn   Visit# / total visits: 10/18    Cancels/No Shows: 0    Subjective:    Pain:  [x] Yes  [] No Location: R PF/arch Pain Ratin/10 (0-10 scale); Comments:Monday went to park and ran 4x. 35miles 2min rest, Wed ran 3min on 2 min off Thurs 5on/2 off. Then Saturday tried to go out with friends and run and it was terrible and had to turn around and come back. Arjay like hip was too tight and I just couldn't get L LE around    Objective:   Manual: Hypervolt B calves, DI B hip flexors proximal and distal, MWM anterior glide B hips, hip distraction, latera glide    Exercises: Rev of HEP  Exercise Reps/ Time Weight/ Level Comments     toe yoga  x30    HEP - emphasis on WB hallux     Lax ball self mob Lonly  1'   HEP    Posture bridge /march  2x10        monster walks           tanisha hip abd         Mobo board foot rocks x30  1/3,2/4    Mobo single leg squat x20                 TM run 3. 3mph/6.0mph  x4 cycles    Eccntric calf       Upper back foam roller x20   x   Pect minor/major stretch x  On foam roller x   Other: No vaso this date    Specific Instructions for next treatment: Continue to work on ankle mobility and posture     NMR   Pace: 10:00  min/mile   Duration: 15 minutes 3 cycles of run/walk 3on/2off   Shoes: Zamudio Adrenaline    Debby:  172 Spm   Cue: Intermittent metronome use and cue for shoulders back and down. Treatment Charges: Mins Units   []  Modalities     [x]  Ther Exercise 10 1   [x]  Manual Therapy 50 3   []  Phys Perf eval     [x]  NMR 15 1   []  Vasocompression     []  DN 1-2 muscles     Total Treatment time 70 5       Assessment: [x] Progressing toward goals. [] No change. [x] Other:Pt continues to lack hip extension and is too stiff at thoracic spine. Able to get pt running with improved form with cue to get shoulders back and down which gets him off his toes and keeps lumbar spine more neutral. Needs to work on glute max strength and endurance. STG: (to be met in 4-6 treatments) - New goals set 1/22/20  1. Reduce pain to 1-2/10   2. Implement strength training regimen for core/LEs  3. Reduce swelling medial foot. LTG: (to be met in 12+ treatments)  1. Pt to be able to run without pain   2.   5/5 strength R ankle        Patient goals: New goal: Return to being able to train fully for the marathon (1/22/20)       Pt. Education:  [x] Yes  [] No  [x] Reviewed Prior HEP/Ed  Method of Education: [] Verbal  [] Demo  [] Written  Comprehension of Education:  [x] Verbalizes understanding. [] Demonstrates understanding. [] Needs review. [x] Demonstrates/verbalizes HEP/Ed previously given.      Plan: [x] Continue per plan of care prn.   [] Other:      Time In:1405   Time Out: 4353    Electronically signed by:  Jose Driscoll, PT

## 2020-03-31 ENCOUNTER — APPOINTMENT (OUTPATIENT)
Dept: PHYSICAL THERAPY | Facility: CLINIC | Age: 60
End: 2020-03-31
Payer: COMMERCIAL

## 2020-04-13 ENCOUNTER — HOSPITAL ENCOUNTER (OUTPATIENT)
Dept: PHYSICAL THERAPY | Facility: CLINIC | Age: 60
Setting detail: THERAPIES SERIES
Discharge: HOME | End: 2020-04-13
Payer: COMMERCIAL

## 2020-04-13 PROCEDURE — 97530 THERAPEUTIC ACTIVITIES: CPT

## 2020-04-13 PROCEDURE — 97110 THERAPEUTIC EXERCISES: CPT

## 2020-06-02 ENCOUNTER — HOSPITAL ENCOUNTER (OUTPATIENT)
Dept: PHYSICAL THERAPY | Facility: CLINIC | Age: 60
Setting detail: THERAPIES SERIES
Discharge: HOME | End: 2020-06-02
Payer: COMMERCIAL

## 2020-06-02 PROCEDURE — 97140 MANUAL THERAPY 1/> REGIONS: CPT

## 2020-06-02 NOTE — FLOWSHEET NOTE
by 20-25% in mid brain and dural tube with improved symmetry and quality of rhythm after treatment in the cranial vault and dural tube. Progressing with long term goals. [] No change. [] Other:       STG: (to be met in 4-6 treatments) - Achieved   1. ? Function: Improve running per patient report. 2. Independent with Home Exercise Programs  3. Demonstrate Knowledge of fall prevention  LTG: (to be met in 12+ treatments)  1. Pt to be able to run 5+ miles with no anxiety/fear of falling - achieved  2. Increase CSF flow to 80% or > in the cranial vault and dural tube- progressing. 3.  Balance of CSF flow bilaterally - progressing        Patient goals: Increase running ability and endurance with no anxiety/fear of falling        Pt. Education:  [x] Yes  [] No  [x] Reviewed Prior HEP/Ed  Method of Education: [] Verbal  [] Demo  [] Written  Comprehension of Education:  [x] Verbalizes understanding. [] Demonstrates understanding. [] Needs review. [x] Demonstrates/verbalizes HEP/Ed previously given.      Plan: [x] Continue per plan of care prn.   [] Other:      Time In:1300    Time Out: 1400    Electronically signed by:  Jose Juan Cabrera PT

## 2020-06-08 ENCOUNTER — HOSPITAL ENCOUNTER (OUTPATIENT)
Dept: PHYSICAL THERAPY | Facility: CLINIC | Age: 60
Setting detail: THERAPIES SERIES
Discharge: HOME | End: 2020-06-08
Payer: COMMERCIAL

## 2020-06-08 PROCEDURE — 97140 MANUAL THERAPY 1/> REGIONS: CPT

## 2020-06-16 ENCOUNTER — HOSPITAL ENCOUNTER (OUTPATIENT)
Dept: PHYSICAL THERAPY | Facility: CLINIC | Age: 60
Setting detail: THERAPIES SERIES
Discharge: HOME | End: 2020-06-16
Payer: COMMERCIAL

## 2020-06-16 PROCEDURE — 97140 MANUAL THERAPY 1/> REGIONS: CPT

## 2020-06-16 NOTE — FLOWSHEET NOTE
Vasocompression     []  Other     Total Treatment time 60 4       Assessment: [x]  Improved CSF flow by 15-20% in mid brain and dural tube with improved symmetry and quality of rhythm after treatment in the cranial vault and dural tube. Pt reported feeling better after treatment. Progressing with long term goals. = pelvis post treatment    [] No change. [] Other:       STG: (to be met in 4-6 treatments) - Achieved   1. ? Function: Improve running per patient report. 2. Independent with Home Exercise Programs  3. Demonstrate Knowledge of fall prevention  LTG: (to be met in 12+ treatments)  1. Pt to be able to run 5+ miles with no anxiety/fear of falling - achieved  2. Increase CSF flow to 80% or > in the cranial vault and dural tube- progressing. 3.  Balance of CSF flow bilaterally - progressing        Patient goals: Increase running ability and endurance with no anxiety/fear of falling        Pt. Education:  [x] Yes  [] No  [x] Reviewed Prior HEP/Ed  Method of Education: [] Verbal  [] Demo  [] Written  Comprehension of Education:  [x] Verbalizes understanding. [] Demonstrates understanding. [] Needs review. [x] Demonstrates/verbalizes HEP/Ed previously given.      Plan: [x] Continue per plan of care prn.   [] Other:      Time In:1200    Time Out: 1300    Electronically signed by:  Annette Jackson PT

## 2020-06-23 ENCOUNTER — HOSPITAL ENCOUNTER (OUTPATIENT)
Dept: PHYSICAL THERAPY | Facility: CLINIC | Age: 60
Setting detail: THERAPIES SERIES
Discharge: HOME | End: 2020-06-23
Payer: COMMERCIAL

## 2020-06-23 PROCEDURE — 97112 NEUROMUSCULAR REEDUCATION: CPT

## 2020-06-23 PROCEDURE — 97140 MANUAL THERAPY 1/> REGIONS: CPT

## 2020-06-30 ENCOUNTER — HOSPITAL ENCOUNTER (OUTPATIENT)
Dept: PHYSICAL THERAPY | Facility: CLINIC | Age: 60
Setting detail: THERAPIES SERIES
Discharge: HOME | End: 2020-06-30
Payer: COMMERCIAL

## 2020-06-30 PROCEDURE — 97112 NEUROMUSCULAR REEDUCATION: CPT

## 2020-06-30 PROCEDURE — 97140 MANUAL THERAPY 1/> REGIONS: CPT

## 2020-06-30 NOTE — FLOWSHEET NOTE
[] [facility]) [facility] &  Therapy  [address]  [phone]  F: [phone] [] [address]  [facility]   [address]  P: [phone]  F: [phone] [x] [address] &  Therapy  [address]  P: [phone]  F: [phone] [] [address]  [facility]   [address]  [place]: [phone]  F: [phone]     Physical Therapy Daily Treatment Note    Date:  2020  Patient Name:  Karthik Mack    :  1960  MRN: [id number]  Physician: Leon Hernandez MD                 Insurance: [facility]  Medical Diagnosis: R HS strain, neuropathy; vertigo/dizziness, post-concussion syndrome                   Rehab Codes: X58.971D; G62.9; R42  Onset date: 17                 Next 's appt.: prn     Visit# / total visits:   Cancels/No Shows: 0    Subjective:    Pain:  - Yes  - No Location:  N/A Pain Rating: (0-10 scale) 1/10. Feeling better overall. His 70.3 race was cancelled. Just started running again yesterday after a busy weekend, and felt pretty good. Only tightness in R HS. Hip feeling better. Pt wants treatment for his post-concussion syndrome today as well as for his R HS. Notes  tightness in his R HS prox attachment continues, but is less. with running lately. Comments:    Objective: Reduced mid brain CSF flow with decreased quality and symmetry by 30%; = pelvis, tightness R glut med/max, piriformis, HS  Modalities:  Manual therapy 60 min: No MET to correct R ant innominate.  ART/MFR to R glut med, HS, hip ERs; Stretching R > L HS, CST/MFR to pelvic diaphragm, t-inlet, hyoid release, OCB, cranial vault,  temporal release, ear pull temporal release, sphenobasilar release, mandibular release, brain work, still point induction, VI &D  NMR- added NMES with IDN to R hip/HS with good tolerance for 15 min  Precautions:    Specific Instructions for next treatment:    Treatment Charges: Mins Units   []  Modalities     []  Ther Exercise     [x]  Manual Therapy 45 3   []  Ther Activities     []  Aquatics     []  Vasocompression     [x]  Other/NMR 15 1   Total Treatment time 60 4       Assessment: [x]  Improved CSF flow by 10% in mid brain and dural tube with improved symmetry and quality of rhythm after treatment in the cranial vault and dural tube. Pt reported feeling better after treatment. Progressing with long term goals. = pelvis post treatment    [] No change. [] Other:       STG: (to be met in 4-6 treatments) - Achieved   1. ? Function: Improve running per patient report. 2. Independent with Home Exercise Programs  3. Demonstrate Knowledge of fall prevention  LTG: (to be met in 12+ treatments)  1. Pt to be able to run 5+ miles with no anxiety/fear of falling - achieved  2. Increase CSF flow to 80% or > in the cranial vault and dural tube- progressing. 3.  Balance of CSF flow bilaterally - progressing        Patient goals: Increase running ability and endurance with no anxiety/fear of falling        Pt. Education:  [x] Yes  [] No  [x] Reviewed Prior HEP/Ed  Method of Education: [] Verbal  [] Demo  [] Written  Comprehension of Education:  [x] Verbalizes understanding. [] Demonstrates understanding. [] Needs review. [x] Demonstrates/verbalizes HEP/Ed previously given.      Plan: [x] Continue per plan of care prn.   [] Other:      Time In:1000    Time Out: 1100    Electronically signed by:  Primo Lai, PT

## 2020-07-07 ENCOUNTER — HOSPITAL ENCOUNTER (OUTPATIENT)
Dept: PHYSICAL THERAPY | Facility: CLINIC | Age: 60
Setting detail: THERAPIES SERIES
Discharge: HOME | End: 2020-07-07
Payer: COMMERCIAL

## 2020-07-07 NOTE — FLOWSHEET NOTE
[] [address]  [address]    [phone]  F: [phone]   [] [address] [facility] [address]   [address]  P: [phone]  F: [phone]  [x] [address]  [address] [facility]  P: [phone]  F: [phone]  [] [address]  [facility]   [address]   [place]: [phone]  F: [phone]   [] [place] [address] [facility] [address]  [place]: [phone]   F: [phone]     Physical Therapy Cancel/No Show note    Date: 2020  Patient: Donato Lazar  : 1960  MRN: [id number]    Cancels/No Shows to date: 1    For today's appointment patient:    [x]  Cancelled    [] Rescheduled appointment    [] No-show     Reason given by patient:    []  Patient ill    []  Conflicting appointment    [] No transportation      [] Conflict with work    [x] No reason given    [] Weather related    [] [id number]    [] Other:      Comments:        [x] Next appointment was confirmed    Electronically signed by: Rommel Wilson

## 2021-01-22 ENCOUNTER — HOSPITAL ENCOUNTER (OUTPATIENT)
Dept: PHYSICAL THERAPY | Facility: CLINIC | Age: 61
Setting detail: THERAPIES SERIES
Discharge: HOME | End: 2021-01-22
Payer: COMMERCIAL

## 2021-01-22 PROCEDURE — 97110 THERAPEUTIC EXERCISES: CPT

## 2021-01-22 PROCEDURE — 97140 MANUAL THERAPY 1/> REGIONS: CPT

## 2021-01-22 PROCEDURE — 97161 PT EVAL LOW COMPLEX 20 MIN: CPT

## 2021-01-22 NOTE — CONSULTS
[x] [facility]    [address] [place] [address]    Phone: [phone]    Fax:  [phone]     Physical Therapy Running Evaluation    Date:  2021  Patient: Shanon Naylor   : 1960  MRN: [id number]  Physician: Dr. Melissa Harding: Monica Kuo  Diagnosis: R plantar fasciitis Rehab Codes: R26.89  Onset date:  20   Next Dr's appt.: none  Goal: [facility] marathon    Subjective:   CC: gait dysfunction on R LE  HPI: starting in , started noticing that he was moving his R foot in a different pattern. Long runs 7-9 miles. The faster he goes, the more pronoucned it becomes. 1/2 way into a run he feels more normal (pre accident). If he runs, he has to stay focused in order to run. If not, he is \"chasing his feet\" . When he runs, the R foot doesn't come off of the ground.   Debby is ~165 spm.  He is in [facility] for this     PMHx: [] Unremarkable [] Diabetes [] HTN  [] Pacemaker   [] MI/Heart Problems [] Cancer [] Arthritis  [] Other:              [x] Refer to full medical chart  In EPIC     Tests: [] X-Ray: [] MRI:  [] none:     Medications: [x] Refer to full medical record [] None [] Other:  Allergies:      [x] Refer to full medical record [] None [] Other:    Working:  [] Normal Duty  [] Light Duty  [] Off D/T Condition  [x] Retired    [] Not Employed    []  Disability  [] Other:           Return to work:     Next [place] race: [facility]    Pain:  [] Yes  [] No   Location:   Pain Rating: (0-10 scale)   1.R lateral ankle  0-1/10    Pain altered Tx:  [] Yes  [x] No  Action:  Symptoms:  [] Improving [] Worsening [] Same  Better:  [] Meds    [] Ice pack    [] Sit    [x] Not running  []Stand    [] Walk    [] Stretching   [] Other:  Worse: [] Run    [] Easy    [] Speed work    []Stand    [] Walk    [] Stairs    [] Sit    [] Other:  Sleep: [x] OK    [] Disturbed    Objective:    ROM  ° A/P STRENGTH TESTS (+/-) Left Right Not Tested    Left Right Left Right Ant. Drawer   []   Hip Flex  wnl   Post. Drawer   []   Ext  ltd 4 5 Lachmans   []   ER  wnl   Valgus Stress   []   IR  wnl   Varus Stress   []   ABD  ltd   Anguss   []   ADD  wnl   Pat-Fem Grind   []   Knee Flex     FADIRs   []   Ext     Hip Scouring   []   Ankle DF stif stiff   AMERICAs   []   PF     Piriformis   []   INV     Raghus   []   EVER     Jordan     []   GTE     Roger's   []       OBSERVATION No Deficit Deficit Not Tested Comments   Posture       Leg Length Discrp [x] [] []    Slumped Sitting [] [] []    Palpation [] [x] [] Hip flexor, glut med   Sensation [] [] []    Edema [] [] []    Neurological [] [] []    Patellar Mobility [] [] []    Patellar Orientation [] [] []    Gait [] [x] [] Analysis: hip ext rot as he goes from midstance to toe off and comes off lateral toes         FUNCTIONAL TESTS PAIN NO PAIN COMMENTS   Step Test  [] [] 4/6/8\"   2 legged squat [] []    1 legged squat [] []    10x 1 legged squat  [] [x] + genu valgus     Comments:  Assessment:Patient would benefit from skilled physical therapy services in order to: improve hip mobility and functional strength so that he can run and walk without issue. Client demonstrates decreased hip and knee flexion during swing phase which increases the amount of knee ext at initial contact. This extended position then limits his ability to jump  With any kind of hop. Thus, we are trying to teach him to assume a more squat position so that he can absorb impacts more efficiently. He had a significant amount of difficulty with hip flexion which limits his ability to get foot clearance during swing. Problems:    [] ? Pain:     [x] ? ROM: in hip and ankle (DF)    [x] ? Strength:    [x] ? Function: Not able to run as he wishes   [x] ?  Balance  [] Increased edema:  [] Postural Deviations  [x] Gait Deviations  []  UWRI LEFI score is   [] Other:      STG: (to be met in 5 treatments)  1. ? Pain:  2. ? ROM: with hip ext and adduction  3. ? Strength: 3 heel taps 4\"  4. ? Function:  5. Independent with Home Exercise Programs    LTG: (to be met in 10 treatments)  1. Able to continue training for the marathon  2. Able to perform 15 reps of lateral step down with proper hip movement and no valgus moment  3. ABle to do 15 4\" heel taps                 Patient goals:\"walk/run normal\"    Rehab Potential:  [x] Good  [] Fair  [] Poor   Suggested Professional Referral:  [x] No  [] Yes:  Barriers to Goal Achievement[de-identified]  [x] No  [] Yes:  Domestic Concerns:  [x] No  [] Yes:    Pt. Education:  [x] Plans/Goals, Risks/Benefits discussed  [x] Home exercise program    Method of Education: [x] Verbal  [x] Demo  [] Written  Comprehension of Education:  [] Verbalizes understanding. [] Demonstrates understanding. [x] Needs Review. [] Demonstrates/verbalizes understanding of HEP/Ed previously given. Treatment Plan:  [x] Therapeutic Exercise    [x] Therapeutic Activity  [x] Manual Therapy   [x] Alter G treadmill  [x] Phys perf test     [x] Vasocompression/Game Ready   [x] Neuromuscular Re-education [x] Instruction in HEP     [x] Aquatic Therapy                           Frequency:  1x/week for 10 visits    Todays Treatment:  Modalities:   Precautions: standard  Exercises:  Exercise Reps/ Time Weight/ Level Issued for HEP  Comments   Supine        2 legged bridges        1 legged bridges        PB hamstring curls     Hips to remain in neutral to ext   [employer]        PB hip flexion     In push up position   March w/ TB  20 blue x x Issued blue and orange   Step downs 2x15 6\" x x  lateral, 80 spm   Functional reach *    W/ slider   Reverse twisting lunge *       Front squats *    No L ext   Ninja jumps *    Soft landings on box; jump up/step down   Depth drops *       Depth jumps *    Jump down w/ low alyse   Other:  Manual  1.  DI to R hip flexor (proximal and distal), glut med  2.   Pubic correction/shotgun maneuver: +2 cavitations    Specific Instructions for next treatment: add above ex    Treatment Charges: Mins Units   [x] Evaluation       [x]  Low       []  Moderate       []  High  1   [] Phys perf test     [x]  Ther Exercise 20 1   [x]  Manual Therapy 15 1   []  Ther Activities     []  Aquatics     []  Vasocompression     []  NMR       TOTAL TREATMENT TIME: 65    Time in: 6171  Time Out:0372    Electronically signed by: Kaerly Grajeda PT        Physician Signature:________________________________Date:__________________  By signing above or cosigning this note, I have reviewed this plan of care and certify a need for medically necessary rehabilitation services.      *PLEASE SIGN ABOVE AND FAX BACK ALL PAGES*

## 2021-01-29 ENCOUNTER — HOSPITAL ENCOUNTER (OUTPATIENT)
Dept: PHYSICAL THERAPY | Facility: CLINIC | Age: 61
Setting detail: THERAPIES SERIES
Discharge: HOME | End: 2021-01-29
Payer: COMMERCIAL

## 2021-01-29 PROCEDURE — 97110 THERAPEUTIC EXERCISES: CPT

## 2021-01-29 PROCEDURE — 97140 MANUAL THERAPY 1/> REGIONS: CPT

## 2021-01-29 NOTE — FLOWSHEET NOTE
[x] [facility]    [address], [place]    Phone: [phone]    Fax:  [phone]       Physical Therapy Daily Treatment Note    Date:  2021  Patient Name:  Marcia Gill    :  1960  MRN: [id number]  Physician: Dr. Pérez Don: Pushpa Romero  Diagnosis: R plantar fasciitis         Rehab Codes: R26.89  Onset date:    20                                   Next Dr's appt.: none  Visit# / total visits:2/10    Cancels/No Shows: 0/0  Goal: GCM marathon    Subjective:    Pain:  [] Yes  [x] No Location:  Pain Rating: (0-10 scale) /10  Pain altered Tx:  [x] No  [] Yes  Action:  Comments: was able to run 8x3'on/1'off on a treadmill and felt much more fluid. Objective:  Modalities: none  Precautions: standard  Exercises:  Exercise Reps/ Time Weight/ Level Issued for HEP   Comments   Gym             Single leg squats  3x10 6 lb ball x  x Eccentric into chair w/ air ex pad    March w/ TB  20 blue x  Issued blue and orange   Step downs 2x15 6\" x   lateral, 80 spm   Functional reach 10x   x x Ant-lat-post   Reverse lunge 2x10    x x     Hover squats 3x10   x x    Ninja jumps *       Soft landings on box; jump up/step down   Depth drops *           Depth jumps *       Jump down w/ low alyse   Other:  Manual  1.  DI to R hip flexor (proximal and distal)  2. Pubic correction/shotgun maneuver: +1 cavitation     Specific Instructions for next treatment:    Treatment Charges: Mins Units   []  Phys perf test     [x]  Ther Exercise 52 3   [x]  Manual Therapy 10 1   []  Ther Activities     []  NMR     []  Vasocompression     []  Other     Total Treatment time 62 4       Assessment: [x] Progressing toward goals. The exercises for today targeted quad and glut max recruitment. He had a great deal of fatigue in both muscle groups. Glut med was no longer tender.   Decreased tenderness on hip flexor. Near full hip ext ROM. Eccentrically, he remains very weak, but is most likely he assumed a more rigid LE with decreased total joint excursion at the knee and hip. [] No change. [] Other:    Goals:  STG: (to be met in 5 treatments)  1. ? Pain:  2. ? ROM: with hip ext and adduction  3. ? Strength: 3 heel taps 4\"  4. ? Function:  5. Independent with Home Exercise Programs     LTG: (to be met in 10 treatments)  1. Able to continue training for the marathon  2. Able to perform 15 reps of lateral step down with proper hip movement and no valgus moment  3. ABle to do 15 4\" heel taps                 Patient goals:\"walk/run normal\"      Pt. Education:  [] Yes  [] No  [x] Reviewed Prior HEP/Ed  Method of Education: [] Verbal  [x] Demo  [x] Written  Comprehension of Education:  [] Verbalizes understanding. [] Demonstrates understanding. [x] Needs review. [] Demonstrates/verbalizes HEP/Ed previously given. Plan: [x] Continue per plan of care.  1x/wk   [] Other:     Time In:1100          Time Out: 3656    Electronically signed by:  Deep Melchor, PT

## 2021-02-05 ENCOUNTER — HOSPITAL ENCOUNTER (OUTPATIENT)
Dept: PHYSICAL THERAPY | Facility: CLINIC | Age: 61
Setting detail: THERAPIES SERIES
Discharge: HOME | End: 2021-02-05
Payer: COMMERCIAL

## 2021-02-05 PROCEDURE — 97110 THERAPEUTIC EXERCISES: CPT

## 2021-02-05 NOTE — FLOWSHEET NOTE
[x] [facility]    [address], [place]    Phone: [phone]    Fax:  [phone]       Physical Therapy Daily Treatment Note    Date:  2021  Patient Name:  Tj Rodriguez    :  1960  MRN: [id number]  Physician: Dr. Carlos Hopkins: Luanne Moreno  Diagnosis: R plantar fasciitis         Rehab Codes: R26.89  Onset date:    20                                   Next Dr's appt.: none  Visit# / total visits: 3/10    Cancels/No Shows: 0/0  Goal:  marathon    Subjective:    Pain:  [] Yes  [x] No Location:  Pain Rating: (0-10 scale) /10  Pain altered Tx:  [x] No  [] Yes  Action:  Comments:hip ROM is improved with yoga. He wishes to add in plyometrics. Objective:  Modalities: none  Precautions: standard  Exercises:  Exercise Reps/ Time Weight/ Level Issued for HEP   Comments   Gym             Single leg squats  3x10 6 lb ball x  Eccentric into chair w/ air ex pad    March w/ TB  20 blue x  Issued blue and orange   Step downs 2x15 6\" x   lateral, 80 spm   Functional reach 10x   x  Ant-lat-post   Split squat hops 2x10    x x     Hover squats 3x10  12 lb x x Towels under heels   Ninja jumps 15 12\"  2/5 x Soft landings on box; jump up/step down   Depth jumps 20 12\"  2/5 x Jump down w/ low alyse   Power strides w/ OH press 15  12\" 2/5 x W/ purple kettle bell    Power strides 2x10 18\" 2/5 x    TG lat squat hops 2x10 L13  x            Other:  Manual-not 2/5  1.  DI to R hip flexor (proximal and distal)  2. Pubic correction/shotgun maneuver: +1 cavitation     Specific Instructions for next treatment:    Treatment Charges: Mins Units   []  Phys perf test     [x]  Ther Exercise 60 4   []  Manual Therapy     []  Ther Activities     []  NMR     []  Vasocompression     []  Other     Total Treatment time 60 4       Assessment: [x] Progressing toward goals.   The exercises for today targeted quad and glut max recruitment. He continues to have a great deal of fatigue in both muscle groups. Eccentrically, he continues to main weak, but is most likely he assumed a more rigid LE with decreased total joint excursion at the knee and hip. Requires tactile cues for proper hip hinge. Used [employer] express to monitor LE landing mechanics    [] No change. [] Other:    Goals:  STG: (to be met in 5 treatments)  1. ? Pain:  2. ? ROM: with hip ext and adduction  3. ? Strength: 3 heel taps 4\"  4. ? Function:  5. Independent with Home Exercise Programs     LTG: (to be met in 10 treatments)  1. Able to continue training for the marathon  2. Able to perform 15 reps of lateral step down with proper hip movement and no valgus moment  3. ABle to do 15 4\" heel taps                 Patient goals:\"walk/run normal\"      Pt. Education:  [] Yes  [] No  [x] Reviewed Prior HEP/Ed  Method of Education: [] Verbal  [x] Demo  [x] Written  Comprehension of Education:  [] Verbalizes understanding. [] Demonstrates understanding. [x] Needs review. [] Demonstrates/verbalizes HEP/Ed previously given. Plan: [x] Continue per plan of care.  1x/wk   [] Other:     Time In:0900`Time Out: 1010    Electronically signed by:  Austin Calvert PT

## 2021-02-12 ENCOUNTER — HOSPITAL ENCOUNTER (OUTPATIENT)
Dept: PHYSICAL THERAPY | Facility: CLINIC | Age: 61
Setting detail: THERAPIES SERIES
Discharge: HOME | End: 2021-02-12
Payer: COMMERCIAL

## 2021-02-12 NOTE — FLOWSHEET NOTE
[] [facility]) - [facility] &  Therapy  [address]    [phone]  F: [phone]   [] [address]  [facility] [address]   [address]  P: [phone]  F: [phone]  [] [address] &  Therapy  [address] [facility]  P: [phone]  F: [phone] [] [address]  P: [phone]  F: [phone]  [] [address]  [facility]   [address]   [place]inos[place]: [phone]  F: [phone]   [] [place] [address] [facility] [address]  [place] Rashid: [phone]   F: [phone]     Physical Therapy Cancel/No Show note    Date: 2021  Patient: Ca Rangel  : 1960  MRN: [id number]    Cancels/No Shows to date: 10    For today's appointment patient:    [x]  Cancelled    [] Rescheduled appointment    [] No-show     Reason given by patient:    []  Patient ill    []  Conflicting appointment    [] No transportation      [] Conflict with work    [] No reason given    [] Weather related    [] COVID-19    [x] Other:      Comments:  Legs are sore    [] Next appointment was confirmed    Electronically signed by: Magda Moncada

## 2021-02-19 ENCOUNTER — APPOINTMENT (OUTPATIENT)
Dept: PHYSICAL THERAPY | Facility: CLINIC | Age: 61
End: 2021-02-19
Payer: COMMERCIAL

## 2021-02-24 ENCOUNTER — HOSPITAL ENCOUNTER (OUTPATIENT)
Dept: PHYSICAL THERAPY | Facility: CLINIC | Age: 61
Setting detail: THERAPIES SERIES
Discharge: HOME | End: 2021-02-24
Payer: COMMERCIAL

## 2021-02-24 PROCEDURE — 97110 THERAPEUTIC EXERCISES: CPT

## 2021-02-24 NOTE — FLOWSHEET NOTE
[x] [facility]    [address], [place]    Phone: [phone]    Fax:  [phone]       Physical Therapy Daily Treatment Note    Date:  2021  Patient Name:  Michael Toledo    :  1960  MRN: [id number]  Physician: Dr. Darius Mera: Sofya Torres  Diagnosis: R plantar fasciitis         Rehab Codes: R26.89  Onset date:    20                                   Next Dr's appt.: none  Visit# / total visits: 4/10    Cancels/No Shows: 0/0  Goal: GCM marathon    Subjective:    Pain:  [] Yes  [x] No Location:  Pain Rating: (0-10 scale) /10  Pain altered Tx:  [x] No  [] Yes  Action:  Comments:he notes a return of decreased L ant tib function over the past couple of months and wishes to be shown how to use an Estim machine (home unit) for muscle re ed. Objective:  Modalities: none  Precautions: standard  Exercises:  Exercise Reps/ Time Weight/ Level Issued for HEP   Comments   Gym             Single leg squats  3x10 6 lb ball x  Eccentric into chair w/ air ex pad    March w/ TB  20 blue x  Issued blue and orange   Step downs 2x15 6\" x   lateral, 80 spm   Functional reach 10x   x  Ant-lat-post   Split squat hops 2x10    x x     Hover squats 3x10  12 lb x x Towels under heels   Ninja jumps 15 12\"  2/5 x Soft landings on box; jump up/step down   Depth jumps 20 12\"  2/5 x Jump down w/ low alyse   Power strides w/ OH press 15  12\" 2/5 x W/ purple kettle bell    Power strides 2x10 18\" 2/5 x    TG lat squat hops 2x10 L13  x            Other:  Manual-not 2/5  1.  DI to R hip flexor (proximal and distal)  2.   Pubic correction/shotgun maneuver: +1 cavitation     Specific Instructions for next treatment:    Treatment Charges: Mins Units   []  Phys perf test     [x]  Ther Exercise 10 1   []  Manual Therapy     []  Ther Activities     []  NMR     []  Vasocompression     []  Other     Total Treatment time 60 4       Assessment: [x] Progressing toward goals. Reviewed electrode placement on L ant tib and how to find the motor points by placing electrode in palm of one hand and not touching the other electrode. Then added grey band for resistance while performing isometric DF 10\" on/50\" off. During off time, he can perform 10 reps of active DF with resistance. He was issued grey TB for HEP    [] No change. [] Other:    Goals:  STG: (to be met in 5 treatments)  1. ? Pain:  2. ? ROM: with hip ext and adduction  3. ? Strength: 3 heel taps 4\"  4. ? Function:  5. Independent with Home Exercise Programs     LTG: (to be met in 10 treatments)  1. Able to continue training for the marathon  2. Able to perform 15 reps of lateral step down with proper hip movement and no valgus moment  3. ABle to do 15 4\" heel taps                 Patient goals:\"walk/run normal\"      Pt. Education:  [] Yes  [] No  [x] Reviewed Prior HEP/Ed  Method of Education: [x] Verbal  [x] Demo  [] Written  Comprehension of Education:  [] Verbalizes understanding. [] Demonstrates understanding. [x] Needs review. [] Demonstrates/verbalizes HEP/Ed previously given. Plan: [x] Continue per plan of care.  1x/wk   [] Other:     Time In:1142Time Out: 1155    Electronically signed by:  Agus Koch PT

## 2021-02-24 NOTE — FLOWSHEET NOTE
[] [facility]) - [facility] &  Therapy  [address]    [phone]  F: [phone]   [] [address]  [facility]   [address]  P: [phone]  F: [phone]  [] [address]  [address]  P: [phone]  F: [phone] [] [address]  P: [phone]  F: [phone]  [] [address]  [facility]   [address]   [place]: [phone]  F: [phone]   [] [place] [address] [facility] [address]  [place]: [phone]   F: [phone]     Physical Therapy Cancel/No Show note    Date: 2021  Patient: Zhao Baltazar  : 1960  MRN: [id number]    Cancels/No Shows to date: 9    For today's appointment patient:    [x]  Cancelled for     [] Rescheduled appointment    [] No-show     Reason given by patient:    []  Patient ill    []  Conflicting appointment    [] No transportation      [] Conflict with work    [x] No reason given    [] Weather related    [] [id number]    [x] Other:      Comments:  Pt stopped into clinic and asked to cancel his appt for .     [] Next appointment was confirmed    Electronically signed by: Sosa Talley PTA

## 2021-02-26 ENCOUNTER — HOSPITAL ENCOUNTER (OUTPATIENT)
Dept: PHYSICAL THERAPY | Facility: CLINIC | Age: 61
Setting detail: THERAPIES SERIES
Discharge: HOME | End: 2021-02-26
Payer: COMMERCIAL

## 2021-04-03 NOTE — FLOWSHEET NOTE
NMR         Specific Instructions for next treatment: CST/MFR, SOR  Treatment Charges: Mins Units   [] Modalities/ES VMS       [] Ther Exercise       [x] Manual Therapy 60 4   [] Ther Activities         [] Aquatics         [] Vasocompression         [] NMR       Total Treatment time 60 4           Assessment: Decreased tightness L side of face. Jaw opening to 3 mm post treatment. Pt reported he had more feeling post treatment and face felt more relaxed.      STG: (to be met in 10 treatments)     1. Increase mandibular opening to 35-50%%  2. Increase soft tissue tightness any  3. Improve  ROM R shoulder     LTG: (to be met in 18+ treatments)     1. Increase jaw opening to 75% or more  2. Maximize cervical, hyoid and facial mobility to allow for improved swallowing, chewing, having teeth implants  3. Maximize shoulder mobility.     Patient goals:Improve facial mobility to help with smiling, swallowing and prepping for getting teeth, chewing      Pt. Education: [x] Yes [] No [x] Reviewed facial and shouder ex  Method of Education: [x] Verbal [x] Demo [] Written  Comprehension of Education:  [x] Verbalizes understanding. [x] Demonstrates understanding. [] Needs review. [x] Demonstrates/verbalizes HEP/Ed previously given.       Plan: [x] Continue per plan of care.    [] Other:      Time In: 1400                        Time Out:1500  Electronically signed by: Alek Victor PT             
Yes

## 2023-08-21 ENCOUNTER — HOSPITAL ENCOUNTER (OUTPATIENT)
Dept: PHYSICAL THERAPY | Facility: CLINIC | Age: 63
Setting detail: THERAPIES SERIES
Discharge: HOME | End: 2023-08-21
Payer: MEDICARE

## 2023-08-21 PROCEDURE — 97140 MANUAL THERAPY 1/> REGIONS: CPT

## 2023-08-21 PROCEDURE — 97161 PT EVAL LOW COMPLEX 20 MIN: CPT

## 2023-08-21 PROCEDURE — 97110 THERAPEUTIC EXERCISES: CPT

## 2023-10-13 NOTE — DISCHARGE SUMMARY
[] [address]  [address] [facility].  [phone]  F: [phone] [] [address] [facility]  [address]   [address]  P: [phone]  F: [phone] [x] [address]  [address] [facility]  P: [phone]  F: [phone] [] [facility]: [phone]  F: [phone] [] [address]  [address] [facility]   [address]   P: [phone]  F: [phone]  [] [address] [facility].   P: [phone]  F: [phone] [] [address] [facility]  [address]  P: [phone]  F: [phone] [] [address] [facility]  [address] [facility]  [place]: [phone]  F: [phone] [] [address]  [place]: [phone]  F: [phone]      Physical Therapy Discharge Note    Date: 10/13/2023      Patient: Katia Snider  : 1960  MRN: [id number]    Physician: Adry Hood                          Insurance: MEDICARE  Medical Diagnosis: Shoulder pain, left         Rehab Codes: M25.512  Onset Date:                                            Next 's appt: n/a  Total visits attended:1  Cancels/No shows:3  Date of only visit: 23                   Assessment:  STG: (to be met in 5 treatments)  ? ROM: increase shoulder ROM especially in flexion and abduction  ? Strength: increase strength of scapular retractors  ?  Function: increase ability to swim and complete yoga poses feeling less limitation  Patient to be independent with home exercise program as demonstrated by